# Patient Record
Sex: FEMALE | Race: WHITE
[De-identification: names, ages, dates, MRNs, and addresses within clinical notes are randomized per-mention and may not be internally consistent; named-entity substitution may affect disease eponyms.]

---

## 2019-10-29 ENCOUNTER — HOSPITAL ENCOUNTER (OUTPATIENT)
Dept: HOSPITAL 47 - LABWHC1 | Age: 79
Discharge: HOME | End: 2019-10-29
Attending: INTERNAL MEDICINE
Payer: MEDICARE

## 2019-10-29 DIAGNOSIS — M81.0: Primary | ICD-10-CM

## 2019-10-29 LAB
ALBUMIN SERPL-MCNC: 4.4 G/DL (ref 3.8–4.9)
ALBUMIN/GLOB SERPL: 2.44 G/DL (ref 1.6–3.17)
ALP SERPL-CCNC: 121 U/L (ref 41–126)
ALT SERPL-CCNC: 17 U/L (ref 8–44)
ANION GAP SERPL CALC-SCNC: 7.6 MMOL/L (ref 4–12)
AST SERPL-CCNC: 24 U/L (ref 13–35)
BUN SERPL-SCNC: 21 MG/DL (ref 9–27)
BUN/CREAT SERPL: 35 RATIO (ref 12–20)
CALCIUM SPEC-MCNC: 9.3 MG/DL (ref 8.7–10.3)
CHLORIDE SERPL-SCNC: 109 MMOL/L (ref 96–109)
CO2 SERPL-SCNC: 26.4 MMOL/L (ref 21.6–31.8)
GLOBULIN SER CALC-MCNC: 1.8 G/DL (ref 1.6–3.3)
GLUCOSE SERPL-MCNC: 96 MG/DL (ref 70–110)
POTASSIUM SERPL-SCNC: 4.1 MMOL/L (ref 3.5–5.5)
PROT SERPL-MCNC: 6.2 G/DL (ref 6.2–8.2)
SODIUM SERPL-SCNC: 143 MMOL/L (ref 135–145)

## 2019-10-29 PROCEDURE — 80053 COMPREHEN METABOLIC PANEL: CPT

## 2019-10-29 PROCEDURE — 84443 ASSAY THYROID STIM HORMONE: CPT

## 2019-10-29 PROCEDURE — 82306 VITAMIN D 25 HYDROXY: CPT

## 2019-10-29 PROCEDURE — 83970 ASSAY OF PARATHORMONE: CPT

## 2019-10-29 PROCEDURE — 36415 COLL VENOUS BLD VENIPUNCTURE: CPT

## 2020-09-21 ENCOUNTER — HOSPITAL ENCOUNTER (INPATIENT)
Dept: HOSPITAL 47 - EC | Age: 80
LOS: 4 days | Discharge: NURSING, REHAB OR LONG-TERM CARE | DRG: 872 | End: 2020-09-25
Attending: HOSPITALIST | Admitting: HOSPITALIST
Payer: MEDICARE

## 2020-09-21 DIAGNOSIS — G20: ICD-10-CM

## 2020-09-21 DIAGNOSIS — K64.9: ICD-10-CM

## 2020-09-21 DIAGNOSIS — Z20.828: ICD-10-CM

## 2020-09-21 DIAGNOSIS — I48.0: ICD-10-CM

## 2020-09-21 DIAGNOSIS — A41.9: Primary | ICD-10-CM

## 2020-09-21 DIAGNOSIS — Z90.49: ICD-10-CM

## 2020-09-21 DIAGNOSIS — I10: ICD-10-CM

## 2020-09-21 DIAGNOSIS — K52.9: ICD-10-CM

## 2020-09-21 DIAGNOSIS — Z87.891: ICD-10-CM

## 2020-09-21 DIAGNOSIS — F41.9: ICD-10-CM

## 2020-09-21 DIAGNOSIS — Z79.899: ICD-10-CM

## 2020-09-21 DIAGNOSIS — Z86.79: ICD-10-CM

## 2020-09-21 DIAGNOSIS — Z79.82: ICD-10-CM

## 2020-09-21 DIAGNOSIS — E86.0: ICD-10-CM

## 2020-09-21 DIAGNOSIS — Z98.41: ICD-10-CM

## 2020-09-21 DIAGNOSIS — R53.1: ICD-10-CM

## 2020-09-21 DIAGNOSIS — E83.42: ICD-10-CM

## 2020-09-21 DIAGNOSIS — K62.3: ICD-10-CM

## 2020-09-21 DIAGNOSIS — E87.6: ICD-10-CM

## 2020-09-21 DIAGNOSIS — K57.32: ICD-10-CM

## 2020-09-21 DIAGNOSIS — K21.9: ICD-10-CM

## 2020-09-21 DIAGNOSIS — F32.9: ICD-10-CM

## 2020-09-21 PROCEDURE — 99285 EMERGENCY DEPT VISIT HI MDM: CPT

## 2020-09-21 PROCEDURE — 36415 COLL VENOUS BLD VENIPUNCTURE: CPT

## 2020-09-21 PROCEDURE — 93005 ELECTROCARDIOGRAM TRACING: CPT

## 2020-09-21 PROCEDURE — 51701 INSERT BLADDER CATHETER: CPT

## 2020-09-21 PROCEDURE — 85730 THROMBOPLASTIN TIME PARTIAL: CPT

## 2020-09-21 PROCEDURE — 83605 ASSAY OF LACTIC ACID: CPT

## 2020-09-21 PROCEDURE — 84443 ASSAY THYROID STIM HORMONE: CPT

## 2020-09-21 PROCEDURE — 71046 X-RAY EXAM CHEST 2 VIEWS: CPT

## 2020-09-21 PROCEDURE — 85610 PROTHROMBIN TIME: CPT

## 2020-09-21 PROCEDURE — 85025 COMPLETE CBC W/AUTO DIFF WBC: CPT

## 2020-09-21 PROCEDURE — 74177 CT ABD & PELVIS W/CONTRAST: CPT

## 2020-09-21 PROCEDURE — 96375 TX/PRO/DX INJ NEW DRUG ADDON: CPT

## 2020-09-21 PROCEDURE — 85027 COMPLETE CBC AUTOMATED: CPT

## 2020-09-21 PROCEDURE — 81003 URINALYSIS AUTO W/O SCOPE: CPT

## 2020-09-21 PROCEDURE — 93306 TTE W/DOPPLER COMPLETE: CPT

## 2020-09-21 PROCEDURE — 83735 ASSAY OF MAGNESIUM: CPT

## 2020-09-21 PROCEDURE — 87040 BLOOD CULTURE FOR BACTERIA: CPT

## 2020-09-21 PROCEDURE — 96374 THER/PROPH/DIAG INJ IV PUSH: CPT

## 2020-09-21 PROCEDURE — 80048 BASIC METABOLIC PNL TOTAL CA: CPT

## 2020-09-21 PROCEDURE — 80053 COMPREHEN METABOLIC PANEL: CPT

## 2020-09-22 LAB
ALBUMIN SERPL-MCNC: 3.5 G/DL (ref 3.5–5)
ALP SERPL-CCNC: 137 U/L (ref 38–126)
ALT SERPL-CCNC: 9 U/L (ref 4–34)
ANION GAP SERPL CALC-SCNC: 7 MMOL/L
APTT BLD: 24.7 SEC (ref 22–30)
AST SERPL-CCNC: 35 U/L (ref 14–36)
BASOPHILS # BLD AUTO: 0 K/UL (ref 0–0.2)
BASOPHILS NFR BLD AUTO: 0 %
BUN SERPL-SCNC: 30 MG/DL (ref 7–17)
CALCIUM SPEC-MCNC: 8.7 MG/DL (ref 8.4–10.2)
CHLORIDE SERPL-SCNC: 106 MMOL/L (ref 98–107)
CO2 SERPL-SCNC: 23 MMOL/L (ref 22–30)
EOSINOPHIL # BLD AUTO: 0 K/UL (ref 0–0.7)
EOSINOPHIL NFR BLD AUTO: 0 %
ERYTHROCYTE [DISTWIDTH] IN BLOOD BY AUTOMATED COUNT: 4 M/UL (ref 3.8–5.4)
ERYTHROCYTE [DISTWIDTH] IN BLOOD: 12.8 % (ref 11.5–15.5)
GLUCOSE SERPL-MCNC: 122 MG/DL (ref 74–99)
HCT VFR BLD AUTO: 37.3 % (ref 34–46)
HGB BLD-MCNC: 12.1 GM/DL (ref 11.4–16)
INR PPP: 1.1 (ref ?–1.2)
LYMPHOCYTES # SPEC AUTO: 0.3 K/UL (ref 1–4.8)
LYMPHOCYTES NFR SPEC AUTO: 3 %
MAGNESIUM SPEC-SCNC: 2.2 MG/DL (ref 1.6–2.3)
MCH RBC QN AUTO: 30.4 PG (ref 25–35)
MCHC RBC AUTO-ENTMCNC: 32.5 G/DL (ref 31–37)
MCV RBC AUTO: 93.4 FL (ref 80–100)
MONOCYTES # BLD AUTO: 0.4 K/UL (ref 0–1)
MONOCYTES NFR BLD AUTO: 4 %
NEUTROPHILS # BLD AUTO: 10.6 K/UL (ref 1.3–7.7)
NEUTROPHILS NFR BLD AUTO: 93 %
PH UR: 6.5 [PH] (ref 5–8)
PLATELET # BLD AUTO: 188 K/UL (ref 150–450)
POTASSIUM SERPL-SCNC: 4.1 MMOL/L (ref 3.5–5.1)
PROT SERPL-MCNC: 6.1 G/DL (ref 6.3–8.2)
PT BLD: 11.4 SEC (ref 9–12)
SODIUM SERPL-SCNC: 136 MMOL/L (ref 137–145)
SP GR UR: 1.03 (ref 1–1.03)
UROBILINOGEN UR QL STRIP: 3 MG/DL (ref ?–2)
WBC # BLD AUTO: 11.4 K/UL (ref 3.8–10.6)

## 2020-09-22 RX ADMIN — METOPROLOL TARTRATE SCH MG: 25 TABLET, FILM COATED ORAL at 21:00

## 2020-09-22 RX ADMIN — METOPROLOL TARTRATE SCH MG: 25 TABLET, FILM COATED ORAL at 17:12

## 2020-09-22 RX ADMIN — BACLOFEN SCH MG: 10 TABLET ORAL at 17:12

## 2020-09-22 RX ADMIN — FLECAINIDE ACETATE SCH MG: 50 TABLET ORAL at 13:25

## 2020-09-22 RX ADMIN — CEFAZOLIN SCH MLS/HR: 330 INJECTION, POWDER, FOR SOLUTION INTRAMUSCULAR; INTRAVENOUS at 03:30

## 2020-09-22 RX ADMIN — CARBIDOPA AND LEVODOPA SCH EACH: 25; 100 TABLET ORAL at 17:12

## 2020-09-22 RX ADMIN — CARBIDOPA AND LEVODOPA SCH EACH: 25; 100 TABLET ORAL at 23:07

## 2020-09-22 RX ADMIN — CEFAZOLIN SCH MLS/HR: 330 INJECTION, POWDER, FOR SOLUTION INTRAMUSCULAR; INTRAVENOUS at 21:12

## 2020-09-22 RX ADMIN — CARBIDOPA AND LEVODOPA SCH EACH: 25; 100 TABLET ORAL at 20:58

## 2020-09-22 RX ADMIN — CARBIDOPA AND LEVODOPA SCH EACH: 25; 100 TABLET ORAL at 13:25

## 2020-09-22 RX ADMIN — METRONIDAZOLE SCH MLS/HR: 500 INJECTION, SOLUTION INTRAVENOUS at 21:00

## 2020-09-22 RX ADMIN — METOPROLOL TARTRATE SCH MG: 25 TABLET, FILM COATED ORAL at 13:25

## 2020-09-22 RX ADMIN — FLECAINIDE ACETATE SCH MG: 50 TABLET ORAL at 23:07

## 2020-09-22 RX ADMIN — BUTALBITAL, ACETAMINOPHEN, AND CAFFEINE PRN EACH: 50; 325; 40 TABLET ORAL at 19:05

## 2020-09-22 RX ADMIN — BACLOFEN SCH MG: 10 TABLET ORAL at 21:00

## 2020-09-22 RX ADMIN — NICARDIPINE HYDROCHLORIDE SCH MLS/HR: 2.5 INJECTION INTRAVENOUS at 01:21

## 2020-09-22 RX ADMIN — METRONIDAZOLE SCH MLS/HR: 500 INJECTION, SOLUTION INTRAVENOUS at 13:18

## 2020-09-22 RX ADMIN — NICARDIPINE HYDROCHLORIDE SCH MLS/HR: 2.5 INJECTION INTRAVENOUS at 00:50

## 2020-09-22 NOTE — CT
EXAMINATION TYPE: CT abdomen pelvis w con

 

DATE OF EXAM: 9/22/2020

 

COMPARISON: None

 

HISTORY: Abd Pain

 

CT DLP: 883.70 mGycm

Automated exposure control for dose reduction was used.

 

CONTRAST: 

Performed with IV Contrast, patient injected with 100 mL of Isovue 300.

 

Heart is enlarged. There is some patchy atelectasis at the lung bases. There is metal artifact from m
ultilevel posterior lower thoracic and lumbar spine fusion surgery.

 

Liver shows no focal defect. There are clips apparently from cholecystectomy. Spleen is intact. There
 is no sign of pancreatic mass.

 

There is no adrenal mass. Kidneys show satisfactory contrast opacification. There is no hydronephrosi
s. Ureters are not dilated. There is no retroperitoneal adenopathy. Bladder distends smoothly. There 
is no inguinal hernia. There is small amount of free fluid in the abdomen. There appears to be wall t
hickening of the sigmoid colon.

 

Appendix is posterior and appears normal. I see no sign of a bowel obstruction. There are no dilated 
loops. There is no evidence of free air.

 

The bony pelvis is intact. There are numerous thoracic and lumbar compression fractures. There are co
mpression fractures up to 40%. This is seen at L2 L1 T10 and T9 vertebra.

 

IMPRESSION:

There is minimal free fluid in the abdomen. There appears to be wall thickening of the sigmoid colon 
suggestive of nonspecific colitis. No free air.

 

Mildly dilated biliary tree but no obstructing lesion seen.

## 2020-09-22 NOTE — P.HPIM
History of Present Illness


The patient is a pleasant 79-year-old female came in now with the complaints of 

diarrhea watery with diffuse abdominal cramping restarted yesterday patient 

developed had fever last night.  Patient's abdominal pain is cramping in sens

ation.  Patient was told patient may have rectal prolapse by family members 

unsure whether it's hemorrhoid coming out of the rectum.  Patient also found to 

be in atrial fibrillation with rapid ventricular rate was started on Cardizem 

and do not have any previous echocardiogram available at this time.  Patient 

denied any cough patient denied any URI symptoms.  Patient was exposed to sick 

contacts and had a recent general also has diarrhea.








Review of Systems








REVIEW OF SYSTEMS: 


CONSTITUTIONAL: No fever, no malaise, no fatigue. 


HEENT: No recent visual problems or hearing problems. Denied any sore throat. 


CARDIOVASCULAR: No chest pain, orthopnea, PND, no palpitations, no syncope. 


PULMONARY: No shortness of breath, no cough, no hemoptysis. 


GASTROINTESTINAL: As mentioned in HPI


NEUROLOGICAL: No headaches, no weakness, no numbness. 


HEMATOLOGICAL: Denies any bleeding or petechiae. 


GENITOURINARY: Denies any burning micturition, frequency, or urgency. 


MUSCULOSKELETAL/RHEUMATOLOGICAL: Denies any joint pain, swelling, or any muscle 

pain. 


ENDOCRINE: Denies any polyuria or polydipsia. 





The rest of the 14-point review of systems is negative.











Past Medical History


Past Medical History: Atrial Fibrillation, Eye Disorder, GERD/Reflux, 

Hypertension, Mitral Valve Prolapse (MVP)


Additional Past Medical History / Comment(s): HYPOGLYCEMIC


History of Any Multi-Drug Resistant Organisms: None Reported


Past Surgical History: Back Surgery, Cholecystectomy


Additional Past Surgical History / Comment(s): RT CATARACT REMOVED.  

COLONOSCOPY.  HEMORRHOIDECTOMY


Past Anesthesia/Blood Transfusion Reactions: No Reported Reaction


Past Psychological History: Anxiety, Depression


Smoking Status: Former smoker


Past Alcohol Use History: None Reported


Past Drug Use History: None Reported





Medications and Allergies


                                Home Medications











 Medication  Instructions  Recorded  Confirmed  Type


 


Aspirin 81 mg PO HS 14 History


 


Baclofen [Lioresal] 10 mg PO Q6H 14 History


 


Butalb/Acetaminophen/Caffeine 1 - 2 tab PO TID PRN 14 History





[Fioricet]    


 


Sertraline [Zoloft] 50 mg PO DAILY 14 History


 


atenoloL [Tenormin] 25 mg PO BID@1200,2330 14 History


 


Carbidopa-Levodopa  mg 1 tab PO 5XD 20 History





[Sinemet ]    


 


Flecainide [Tambocor] 50 mg PO BID@1200,2330 20 History


 


Methyl Salicylate/Menthol 1 patch TOPICAL BID 20 History





[Salonpas Patch]    








                                    Allergies











Allergy/AdvReac Type Severity Reaction Status Date / Time


 


No Known Allergies Allergy   Verified 20 08:38














Physical Exam


Vitals: 


                                   Vital Signs











  Temp Pulse Pulse Resp BP BP Pulse Ox


 


 20 08:00  99.3 F   100  16   93/69  92 L


 


 20 05:35    98  18   


 


 20 04:55  101.1 F H   98  18   111/66  93 L


 


 20 04:31  98.7 F  107 H   14  99/69   96


 


 20 04:00   122 H   14  123/76   94 L


 


 20 03:43   128 H   18  136/92   95


 


 20 03:00   126 H   21  116/85  


 


 20 02:30   142 H   16  100/75  


 


 20 02:00   110 H   11 L  96/73  


 


 20 01:41   101 H   16  103/69  


 


 20 01:30   118 H   23  103/69  


 


 20 01:00   124 H   41 H   


 


 20 00:44   140 H   23  103/69  


 


 20 00:14  99.8 F H  115 H   16  103/69   93 L








                                Intake and Output











 20





 22:59 06:59 14:59


 


Other:   


 


  Voiding Method  Bedside Commode 





  Diaper 


 


  # Voids  1 


 


  Weight  57.1 kg 

















PHYSICAL EXAMINATION: 





GENERAL: The patient is alert and oriented x3, not in any acute distress.  Thin 

built female


HEENT: Pupils are round and equally reacting to light. EOMI. No scleral icterus.

No conjunctival pallor. Normocephalic, atraumatic. No pharyngeal erythema. No 

thyromegaly. 


CARDIOVASCULAR: S1 and S2 present. No murmurs, rubs, or gallops. 


PULMONARY: Chest is clear to auscultation, no wheezing or crackles. 


ABDOMEN: Soft, nontender, nondistended, normoactive bowel sounds. No palpable 

organomegaly.  Rectal exam was not done


MUSCULOSKELETAL: No joint swelling or deformity.


EXTREMITIES: No cyanosis, clubbing, or pedal edema. 


NEUROLOGICAL: Gross neurological examination did not reveal any focal deficits. 


SKIN: No rashes. 

















Results


CBC & Chem 7: 


                                 20 00:13





                                 20 00:13


Labs: 


                  Abnormal Lab Results - Last 24 Hours (Table)











  20 Range/Units





  00: 00:13 02:45 


 


WBC  11.4 H    (3.8-10.6)  k/uL


 


Neutrophils #  10.6 H    (1.3-7.7)  k/uL


 


Lymphocytes #  0.3 L    (1.0-4.8)  k/uL


 


Sodium   136 L   (137-145)  mmol/L


 


BUN   30 H   (7-17)  mg/dL


 


Glucose   122 H   (74-99)  mg/dL


 


Alkaline Phosphatase   137 H   ()  U/L


 


Total Protein   6.1 L   (6.3-8.2)  g/dL


 


Urine Protein    Trace H  (Negative)  














Thrombosis Risk Factor Assmnt





- Choose All That Apply


Each Risk Factor Represents 3 Points: Age 75 years or older


Thrombosis Risk Factor Assessment Total Risk Factor Score: 3


Thrombosis Risk Factor Assessment Level: Moderate Risk





Assessment and Plan


Plan: 


-Sepsis: Possibility of colitis only mild colitis and the CT.  C. diff for 

colitis and it to be ruled out as well.  Continue with Rocephin and 

metronidazole for now.  We'll also rule out covid 19 considering recent visit to

 and no clear-cut source of infection although infectious colitis can be 

the source.  I'll also obtain a chest x-ray.  Urinalysis did not show any 

significant abnormality


-Atrial fibrillation: New-onset patient was started on Cardizem, presently not 

on any anti-coagulation cardiology was consulted.


-Gastroesophageal reflux disease


-Hypertension


-History of mitral valve prolapse


-If patient is not started on any anticoagulation with cardiology I'll start her

on dvt prophylaxis

## 2020-09-22 NOTE — ED
Nausea/Vomiting/Diarrhea HPI





- General


Chief complaint: Nausea/Vomiting/Diarrhea


Stated complaint: diarrhea


Time Seen by Provider: 09/21/20 23:54


Source: patient, family, EMS


Mode of arrival: EMS


Limitations: no limitations





- History of Present Illness


Initial comments: 


Alena is a pleasant 78yo female with PMH of afib RVR who presents to the ER 

today via EMS for evaluation of 24hrs of watery diarrhea, diffuse abdominal 

cramping, low grade fever and blood in her stool. Patient reports she has had 

diarrhea, she noticed blood in the stool this evening, her daughter states that 

when she attempted to help clean her mother she noted some "tissue" hanging from

her rectum she is unsure if it was a hemorrhoid or her rectum coming out. 








- Related Data


                                Home Medications











 Medication  Instructions  Recorded  Confirmed


 


ALPRAZolam [Xanax] 0.5 mg PO QAM 12/08/14 12/11/14


 


Aspirin 81 mg PO DAILY 12/08/14 12/11/14


 


Baclofen [Lioresal] 10 mg PO QID 12/08/14 12/11/14


 


Butalb/Acetaminophen/Caffeine 1 - 2 each PO TID PRN 12/08/14 12/11/14





[Fioricet]   


 


Furosemide [Lasix] 20 mg PO BID 12/08/14 12/11/14


 


Omeprazole [PriLOSEC] 20 mg PO AC-BRKFST 12/08/14 12/11/14


 


Primidone [Mysoline] 50 mg PO TID 12/08/14 12/11/14


 


Sertraline [Zoloft] 50 mg PO DAILY 12/08/14 12/11/14


 


atenoloL [Tenormin] 25 mg PO BID 12/08/14 12/11/14


 


oxyCODONE-APAP 10-325MG [Percocet 1 each PO Q6HR PRN 12/08/14 12/11/14





]   











                                    Allergies











Allergy/AdvReac Type Severity Reaction Status Date / Time


 


No Known Allergies Allergy   Verified 09/22/20 00:18














Review of Systems


ROS Statement: 


Those systems with pertinent positive or pertinent negative responses have been 

documented in the HPI.





ROS Other: All systems not noted in ROS Statement are negative.





Past Medical History


Past Medical History: Atrial Fibrillation, Eye Disorder, GERD/Reflux, 

Hypertension, Mitral Valve Prolapse (MVP)


Additional Past Medical History / Comment(s): HYPOGLYCEMIC


History of Any Multi-Drug Resistant Organisms: None Reported


Past Surgical History: Back Surgery, Cholecystectomy


Additional Past Surgical History / Comment(s): RT CATARACT REMOVED.  

COLONOSCOPY.  HEMORRHOIDECTOMY


Past Anesthesia/Blood Transfusion Reactions: No Reported Reaction


Past Psychological History: Anxiety, Depression


Past Alcohol Use History: None Reported


Past Drug Use History: None Reported





General Exam





- General Exam Comments


Initial Comments: 


Physical Exam


GENERAL:


Patient is well-developed and well-nourished.  Patient is nontoxic and well-

hydrated and is in no distress.





HENT:


Normocephalic, Atraumatic. 





EYES:


PERRL, EOMI





PULMONARY:


Unlabored respirations.  No audible rales rhonchi or wheezing was noted.





CARDIOVASCULAR:


Irregularly irregular, 





ABDOMEN:


Soft and nontender with normal bowel sounds. 





SKIN:


Skin is dry and tenting





: 


Normal external genitalia


Rectal exam with external hemorrhoids, no rectal prolapse


Stage 2 decubitus ulcer noted 





NEUROLOGIC:


Patient is alert and oriented x3.  


Moving all extremities spontaneously





MUSCULOSKELETAL:


Normal extremities with adequate strength and full range of motion.  No lower 

extremity swelling or edema.  No calf tenderness.  





PSYCHIATRIC:


Normal psychiatric evaluation.





Limitations: no limitations





Course


                                   Vital Signs











  09/22/20 09/22/20 09/22/20





  00:14 00:44 01:00


 


Temperature 99.8 F H  


 


Pulse Rate 115 H 140 H 124 H


 


Respiratory 16 23 41 H





Rate   


 


Blood Pressure 103/69 103/69 


 


O2 Sat by Pulse 93 L  





Oximetry   














  09/22/20 09/22/20 09/22/20





  01:30 01:41 02:00


 


Temperature   


 


Pulse Rate 118 H 101 H 110 H


 


Respiratory 23 16 11 L





Rate   


 


Blood Pressure 103/69 103/69 96/73


 


O2 Sat by Pulse   





Oximetry   














  09/22/20 09/22/20 09/22/20





  02:30 03:00 03:43


 


Temperature   


 


Pulse Rate 142 H 126 H 128 H


 


Respiratory 16 21 18





Rate   


 


Blood Pressure 100/75 116/85 136/92


 


O2 Sat by Pulse   95





Oximetry   














  09/22/20





  04:00


 


Temperature 


 


Pulse Rate 122 H


 


Respiratory 14





Rate 


 


Blood Pressure 123/76


 


O2 Sat by Pulse 94 L





Oximetry 














Medical Decision Making





- Medical Decision Making


Patient was seen and evaluated history is obtained from patient and daughter 

bedside


Chronically ill 79-year-old female with 24 hours of diarrhea, appears dehydrated

is noted to be in A. fib with RVR this is likely secondary to dehydration as 

well as inability to absorb her medications for the past 24 hours due to 

profound diarrhea





Labs and IV fluids were ordered





Was relatively unremarkable, however given the fever and diarrhea computed 

tomography scan of the abdomen was ordered





Patient's A. fib RVR was treated with a single dose of Lopressor however there 

is minimal improvement decision was made to start a Cardizem drip





Computed tomography scan revealed colitis, Rocephin and Flagyl were ordered





Patient to be admitted for colitis, dehydration, A. fib RVR








- Lab Data


Result diagrams: 


                                 09/22/20 00:13





                                 09/22/20 00:13


                                   Lab Results











  09/22/20 09/22/20 09/22/20 Range/Units





  00:13 00:13 00:13 


 


WBC  11.4 H    (3.8-10.6)  k/uL


 


RBC  4.00    (3.80-5.40)  m/uL


 


Hgb  12.1    (11.4-16.0)  gm/dL


 


Hct  37.3    (34.0-46.0)  %


 


MCV  93.4    (80.0-100.0)  fL


 


MCH  30.4    (25.0-35.0)  pg


 


MCHC  32.5    (31.0-37.0)  g/dL


 


RDW  12.8    (11.5-15.5)  %


 


Plt Count  188    (150-450)  k/uL


 


Neutrophils %  93    %


 


Lymphocytes %  3    %


 


Monocytes %  4    %


 


Eosinophils %  0    %


 


Basophils %  0    %


 


Neutrophils #  10.6 H    (1.3-7.7)  k/uL


 


Lymphocytes #  0.3 L    (1.0-4.8)  k/uL


 


Monocytes #  0.4    (0-1.0)  k/uL


 


Eosinophils #  0.0    (0-0.7)  k/uL


 


Basophils #  0.0    (0-0.2)  k/uL


 


PT   11.4   (9.0-12.0)  sec


 


INR   1.1   (<1.2)  


 


APTT   24.7   (22.0-30.0)  sec


 


Sodium    136 L  (137-145)  mmol/L


 


Potassium    4.1  (3.5-5.1)  mmol/L


 


Chloride    106  ()  mmol/L


 


Carbon Dioxide    23  (22-30)  mmol/L


 


Anion Gap    7  mmol/L


 


BUN    30 H  (7-17)  mg/dL


 


Creatinine    0.59  (0.52-1.04)  mg/dL


 


Est GFR (CKD-EPI)AfAm    >90  (>60 ml/min/1.73 sqM)  


 


Est GFR (CKD-EPI)NonAf    87  (>60 ml/min/1.73 sqM)  


 


Glucose    122 H  (74-99)  mg/dL


 


Plasma Lactic Acid Atilio     (0.7-2.0)  mmol/L


 


Calcium    8.7  (8.4-10.2)  mg/dL


 


Magnesium    2.2  (1.6-2.3)  mg/dL


 


Total Bilirubin    0.6  (0.2-1.3)  mg/dL


 


AST    35  (14-36)  U/L


 


ALT    9  (4-34)  U/L


 


Alkaline Phosphatase    137 H  ()  U/L


 


Total Protein    6.1 L  (6.3-8.2)  g/dL


 


Albumin    3.5  (3.5-5.0)  g/dL


 


Urine Color     


 


Urine Appearance     (Clear)  


 


Urine pH     (5.0-8.0)  


 


Ur Specific Gravity     (1.001-1.035)  


 


Urine Protein     (Negative)  


 


Urine Glucose (UA)     (Negative)  


 


Urine Ketones     (Negative)  


 


Urine Blood     (Negative)  


 


Urine Nitrite     (Negative)  


 


Urine Bilirubin     (Negative)  


 


Urine Urobilinogen     (<2.0)  mg/dL


 


Ur Leukocyte Esterase     (Negative)  














  09/22/20 09/22/20 Range/Units





  00:13 02:45 


 


WBC    (3.8-10.6)  k/uL


 


RBC    (3.80-5.40)  m/uL


 


Hgb    (11.4-16.0)  gm/dL


 


Hct    (34.0-46.0)  %


 


MCV    (80.0-100.0)  fL


 


MCH    (25.0-35.0)  pg


 


MCHC    (31.0-37.0)  g/dL


 


RDW    (11.5-15.5)  %


 


Plt Count    (150-450)  k/uL


 


Neutrophils %    %


 


Lymphocytes %    %


 


Monocytes %    %


 


Eosinophils %    %


 


Basophils %    %


 


Neutrophils #    (1.3-7.7)  k/uL


 


Lymphocytes #    (1.0-4.8)  k/uL


 


Monocytes #    (0-1.0)  k/uL


 


Eosinophils #    (0-0.7)  k/uL


 


Basophils #    (0-0.2)  k/uL


 


PT    (9.0-12.0)  sec


 


INR    (<1.2)  


 


APTT    (22.0-30.0)  sec


 


Sodium    (137-145)  mmol/L


 


Potassium    (3.5-5.1)  mmol/L


 


Chloride    ()  mmol/L


 


Carbon Dioxide    (22-30)  mmol/L


 


Anion Gap    mmol/L


 


BUN    (7-17)  mg/dL


 


Creatinine    (0.52-1.04)  mg/dL


 


Est GFR (CKD-EPI)AfAm    (>60 ml/min/1.73 sqM)  


 


Est GFR (CKD-EPI)NonAf    (>60 ml/min/1.73 sqM)  


 


Glucose    (74-99)  mg/dL


 


Plasma Lactic Acid Atilio  1.3   (0.7-2.0)  mmol/L


 


Calcium    (8.4-10.2)  mg/dL


 


Magnesium    (1.6-2.3)  mg/dL


 


Total Bilirubin    (0.2-1.3)  mg/dL


 


AST    (14-36)  U/L


 


ALT    (4-34)  U/L


 


Alkaline Phosphatase    ()  U/L


 


Total Protein    (6.3-8.2)  g/dL


 


Albumin    (3.5-5.0)  g/dL


 


Urine Color   Yellow  


 


Urine Appearance   Clear  (Clear)  


 


Urine pH   6.5  (5.0-8.0)  


 


Ur Specific Gravity   1.031  (1.001-1.035)  


 


Urine Protein   Trace H  (Negative)  


 


Urine Glucose (UA)   Negative  (Negative)  


 


Urine Ketones   Negative  (Negative)  


 


Urine Blood   Negative  (Negative)  


 


Urine Nitrite   Negative  (Negative)  


 


Urine Bilirubin   Negative  (Negative)  


 


Urine Urobilinogen   3.0  (<2.0)  mg/dL


 


Ur Leukocyte Esterase   Negative  (Negative)  














- EKG Data


-: EKG Interpreted by Me


EKG Comments: 


KG was obtained due to tachycardia, EKG was obtained at 12:29 AM, rate is 119 

rhythm is a narrow complex irregularly irregular tachycardia consistent with A. 

fib with RVR there is no acute ST elevations or depressions no evidence of acute

ischemia or infarction.








Disposition


Clinical Impression: 


 Atrial fibrillation with RVR, Dehydration, Diarrhea, Bleeding hemorrhoid, 

Colitis





Disposition: ADMITTED AS IP TO THIS HOSP


Condition: Serious


Is patient prescribed a controlled substance at d/c from ED?: No

## 2020-09-22 NOTE — ECHOF
Referral Reason:afib



MEASUREMENTS

--------

HEIGHT: 152.4 cm

WEIGHT: 56.7 kg

BP: 

IVSd:   1.2 cm     (0.6 - 1.1)

LVIDd:   3.9 cm     (3.9 - 5.3)

LVPWd:   1.2 cm     (0.6 - 1.1)

IVSs:   1.5 cm

LVIDs:   3.0 cm

LVPWs:   1.5 cm

LA Diam:   4.1 cm     (2.7 - 3.8)

RVIDd:   2.9 cm     (< 3.3)

LAESV Index (A-L):   52.19 ml/m

Ao Diam:   3.0 cm     (2.0 - 3.7)

AV Cusp:   2.1 cm     (1.5 - 2.6)

EPSS:   0.1 cm

RAP:   15.00 mmHg

RVSP:   46.35 mmHg

MV EF SLOPE:   215.54 mm/s     (70 - 150)

MV EXCURSION:   17.77 mm     (> 18.000)







FINDINGS

--------

Atrial fibrillation.

This was a technically excellent study.

The left ventricular size is normal.   There is borderline concentric left ventricular hypertrophy.  
 Overall left ventricular systolic function is mildly impaired with, an EF between 45 - 50 %.

The right ventricle is normal in size.

LA is severely dilated >40 ml/m2

The right atrium is normal in size.

Interatrial and interventricular septum intact.

There is mild aortic valve sclerosis.

Mild-to-moderate mitral regurgitation is present.

Mild tricuspid regurgitation present.   There is moderate pulmonary hypertension.   The right ventric
ular systolic pressure, as measured by Doppler, is 46.35mmHg.

Trace/mild (physiologic)  pulmonic regurgitation.

The aortic root size is normal.

The inferior vena cava is dilated with poor inspiratory collapse which is consistent with estimated r
ight atrial pressure of 15 mmHg.

There is no pericardial effusion.



CONCLUSIONS

--------

1. The left ventricular size is normal.

2. There is borderline concentric left ventricular hypertrophy.

3. LA is severely dilated >40 ml/m2

4. Mild-to-moderate mitral regurgitation is present.

5. Mild tricuspid regurgitation present.

6. There is moderate pulmonary hypertension.

7. The right ventricular systolic pressure, as measured by Doppler, is 46.35mmHg.

8. Trace/mild (physiologic)  pulmonic regurgitation.

9. The inferior vena cava is dilated with poor inspiratory collapse which is consistent with estimate
d right atrial pressure of 15 mmHg.

10. There is no pericardial effusion.





SONOGRAPHER: Vanessa Dias RDCS

## 2020-09-22 NOTE — XR
EXAMINATION TYPE: XR chest 2V

 

DATE OF EXAM: 9/22/2020

 

COMPARISON: None

 

INDICATION: Pneumonia difficulty breathing

 

TECHNIQUE:  Frontal and lateral views of the chest are obtained.

 

FINDINGS:  

The heart size is normal.  

The pulmonary vasculature is normal.

Some mild streak atelectasis may be at the left base. Some mild blunting left costophrenic angle is p
resent suggesting small effusion. Large portion of the left lung and portion of the medial right uppe
r lobe cannot be well evaluated due to the kyphosis and head over this portion of the study..

 

IMPRESSION:  

1. Limited exam.

2. Mild streak atelectasis left lower lobe

## 2020-09-22 NOTE — P.GSCN
History of Present Illness


Consult date: 09/22/20


History of present illness: 





CHIEF COMPLAINT: Rectal prolapse





HISTORY OF PRESENT ILLNESS: This is a 79-year-old female with a known history of

paroxysmal atrial fibrillation, hypertension, GERD, mitral valve prolapse and 

cholecystectomy.  Patient presented to the emergency room with complaints of 

diarrhea and abdominal cramping.  Patient did notice some blood in her stools.  

Per patient she does feel that there is tissue hanging from her rectum.  She 

noticed symptoms started about 24-48 hours ago.  Patient did have a fever of 

101.1 early this morning.  She is also being followed by cardiology for atrial 

fibrillation with rapid ventricular response.  





PAST MEDICAL HISTORY: 


See list.





PAST SURGICAL HISTORY: 


See list.





MEDICATIONS: 


See list.





ALLERGIES: 


See list.





SOCIAL HISTORY: No illicit drug use.  





REVIEW OF SYSTEMS: 


CONSTITUTIONAL: Denies fever or chills.


HEENT: Denies blurred vision, vision changes, or eye pain. Denies hemoptysis 


CARDIOVASCULAR: Denies chest pain or pressure.


RESPIRATORY: No shortness of breath. 


GASTROINTESTINAL: See HPI for pertinent findings


HEMATOLOGIC: Denies bleeding disorders.


GENITOURINARY:  Denies any blood in urine or increased urinary frequency.  


SKIN: Denies pruitis. Denies rash.





PHYSICAL EXAM: 


VITAL SIGNS: Reviewed


GENERAL: Well-developed in no acute distress. 


HEENT:  No sclera icterus. Extraocular movements grossly intact.  Moist buccal 

mucosa. Head is atraumatic, normocephalic. No nasal drainage.


ABDOMEN:  Soft. Nondistended.  Nontender


NEUROLOGIC:  Alert and oriented.  Cranial nerves II through XII grossly intact.





LABORATORY DATA:


WBC is 11.4 hemoglobin 12.1 and platelets 188 INR 1.1 creatinine 0.59 BUN 30





IMAGING:


CT abdomen and pelvis there is minimal free fluid in the abdomen.  There appears

to be wall thickening of the sigmoid colon suggestive of nonspecific colitis.  

No free air.  Mildly dilated biliary tree but no obstructing lesions seen





ASSESSMENT: 


1.  Possible rectal prolapse


2.  Nausea, vomiting and diarrhea with possible colitis


3.  Paroxysmal atrial fibrillation with episode of rapid ventricular response 

followed by cardiology





PLAN: 


-No surgical intervention planned during admission


-Plan for outpatient colonoscopy once patient is stable


-Antibiotics per medicine





Thank you for this consultation.





Physician Assistant note has been reviewed by physician. Signing provider agrees

with the documented findings, assessment, and plan of care. 





Past Medical History


Past Medical History: Atrial Fibrillation, Eye Disorder, GERD/Reflux, 

Hypertension, Mitral Valve Prolapse (MVP)


Additional Past Medical History / Comment(s): HYPOGLYCEMIC


History of Any Multi-Drug Resistant Organisms: None Reported


Past Surgical History: Back Surgery, Cholecystectomy


Additional Past Surgical History / Comment(s): RT CATARACT REMOVED.  COLONOSCOP

Y.  HEMORRHOIDECTOMY


Past Anesthesia/Blood Transfusion Reactions: No Reported Reaction


Past Psychological History: Anxiety, Depression


Smoking Status: Former smoker


Past Alcohol Use History: None Reported


Past Drug Use History: None Reported





Medications and Allergies


                                Home Medications











 Medication  Instructions  Recorded  Confirmed  Type


 


Aspirin 81 mg PO HS 12/08/14 09/22/20 History


 


Baclofen [Lioresal] 10 mg PO Q6H 12/08/14 09/22/20 History


 


Butalb/Acetaminophen/Caffeine 1 - 2 tab PO TID PRN 12/08/14 09/22/20 History





[Fioricet]    


 


Sertraline [Zoloft] 50 mg PO DAILY 12/08/14 09/22/20 History


 


atenoloL [Tenormin] 25 mg PO BID@1200,2330 12/08/14 09/22/20 History


 


Carbidopa-Levodopa  mg 1 tab PO 5XD 09/22/20 09/22/20 History





[Sinemet ]    


 


Flecainide [Tambocor] 50 mg PO BID@1200,2330 09/22/20 09/22/20 History


 


Methyl Salicylate/Menthol 1 patch TOPICAL BID 09/22/20 09/22/20 History





[Salonpas Patch]    








                                    Allergies











Allergy/AdvReac Type Severity Reaction Status Date / Time


 


No Known Allergies Allergy   Verified 09/22/20 08:38














Surgical - Exam


                                   Vital Signs











Temp Pulse Resp BP Pulse Ox


 


 99.8 F H  115 H  16   103/69   93 L


 


 09/22/20 00:14  09/22/20 00:14  09/22/20 00:14  09/22/20 00:14  09/22/20 00:14














Results





- Labs





                                 09/22/20 00:13





                                 09/22/20 00:13


                  Abnormal Lab Results - Last 24 Hours (Table)











  09/22/20 09/22/20 09/22/20 Range/Units





  00:13 00:13 02:45 


 


WBC  11.4 H    (3.8-10.6)  k/uL


 


Neutrophils #  10.6 H    (1.3-7.7)  k/uL


 


Lymphocytes #  0.3 L    (1.0-4.8)  k/uL


 


Sodium   136 L   (137-145)  mmol/L


 


BUN   30 H   (7-17)  mg/dL


 


Glucose   122 H   (74-99)  mg/dL


 


Alkaline Phosphatase   137 H   ()  U/L


 


Total Protein   6.1 L   (6.3-8.2)  g/dL


 


Urine Protein    Trace H  (Negative)  








                                 Diabetes panel











  09/22/20 Range/Units





  00:13 


 


Sodium  136 L  (137-145)  mmol/L


 


Potassium  4.1  (3.5-5.1)  mmol/L


 


Chloride  106  ()  mmol/L


 


Carbon Dioxide  23  (22-30)  mmol/L


 


BUN  30 H  (7-17)  mg/dL


 


Creatinine  0.59  (0.52-1.04)  mg/dL


 


Glucose  122 H  (74-99)  mg/dL


 


Calcium  8.7  (8.4-10.2)  mg/dL


 


AST  35  (14-36)  U/L


 


ALT  9  (4-34)  U/L


 


Alkaline Phosphatase  137 H  ()  U/L


 


Total Protein  6.1 L  (6.3-8.2)  g/dL


 


Albumin  3.5  (3.5-5.0)  g/dL








                                  Thyroid panel











  09/22/20 Range/Units





  00:13 


 


TSH  1.150  (0.465-4.680)  mIU/L








                                  Calcium panel











  09/22/20 Range/Units





  00:13 


 


Calcium  8.7  (8.4-10.2)  mg/dL


 


Albumin  3.5  (3.5-5.0)  g/dL








                                 Pituitary panel











  09/22/20 09/22/20 Range/Units





  00:13 00:13 


 


Sodium  136 L   (137-145)  mmol/L


 


Potassium  4.1   (3.5-5.1)  mmol/L


 


Chloride  106   ()  mmol/L


 


Carbon Dioxide  23   (22-30)  mmol/L


 


BUN  30 H   (7-17)  mg/dL


 


Creatinine  0.59   (0.52-1.04)  mg/dL


 


Glucose  122 H   (74-99)  mg/dL


 


Calcium  8.7   (8.4-10.2)  mg/dL


 


TSH   1.150  (0.465-4.680)  mIU/L








                                  Adrenal panel











  09/22/20 Range/Units





  00:13 


 


Sodium  136 L  (137-145)  mmol/L


 


Potassium  4.1  (3.5-5.1)  mmol/L


 


Chloride  106  ()  mmol/L


 


Carbon Dioxide  23  (22-30)  mmol/L


 


BUN  30 H  (7-17)  mg/dL


 


Creatinine  0.59  (0.52-1.04)  mg/dL


 


Glucose  122 H  (74-99)  mg/dL


 


Calcium  8.7  (8.4-10.2)  mg/dL


 


Total Bilirubin  0.6  (0.2-1.3)  mg/dL


 


AST  35  (14-36)  U/L


 


ALT  9  (4-34)  U/L


 


Alkaline Phosphatase  137 H  ()  U/L


 


Total Protein  6.1 L  (6.3-8.2)  g/dL


 


Albumin  3.5  (3.5-5.0)  g/dL

## 2020-09-22 NOTE — P.CRDCN
History of Present Illness


Consult date: 09/22/20


Consult reason: atrial fibrillation


Chief complaint: Nausea, vomiting, diarrhea


History of present illness: 


This is an 79-year-old  female with documented history of Parkinson's 

disease, hypertension, mitral valve prolapse, paroxysmal atrial fibrillation, 

she follows with Dr. Villar in the office.  Patient had been on 

anticoagulantion in the past on Eliquis, she had an ALLERGIC reaction to this, 

which is why she's not currently on anticoagulation.  History was obtained from 

the medical record as the patient appears to be confused, apparently the 

daughter gave most of the history.  Patient presented to the hospital with 

symptoms of nausea, vomiting, and diarrhea for 24 hour duration.  She was also 

experiencing some abdominal cramping and low-grade fever.  Her EKG on 

presentation here showed atrial fibrillation with moderately rapid ventricular 

response.  CT of the abdomen and pelvis showed minimal free fluid in the 

abdomen.  Her temperature has been up to 101.1 here, this morning 99.3.  White 

blood cell count 11.4, hemoglobin 12.1, platelet count 188.  Sodium 136, 

potassium 4.1, BUN 30, creatinine 0.5.  Blood pressure 93/60 with a heart rate 

in the 192% on room air.  She was given a Cardizem bolus as well as one dose of 

IV Lopressor on admission here.  Her home medications include Tenormin 25 mg 

twice a day, flat and I 50 twice a day, Sinemet, failure status, aspirin, and 

Zoloft.








Past Medical History


Past Medical History: Atrial Fibrillation, Eye Disorder, GERD/Reflux, 

Hypertension, Mitral Valve Prolapse (MVP)


Additional Past Medical History / Comment(s): HYPOGLYCEMIC


History of Any Multi-Drug Resistant Organisms: None Reported


Past Surgical History: Back Surgery, Cholecystectomy


Additional Past Surgical History / Comment(s): RT CATARACT REMOVED.  

COLONOSCOPY.  HEMORRHOIDECTOMY


Past Anesthesia/Blood Transfusion Reactions: No Reported Reaction


Past Psychological History: Anxiety, Depression


Smoking Status: Former smoker


Past Alcohol Use History: None Reported


Past Drug Use History: None Reported





Medications and Allergies


                                Home Medications











 Medication  Instructions  Recorded  Confirmed  Type


 


Aspirin 81 mg PO HS 12/08/14 09/22/20 History


 


Baclofen [Lioresal] 10 mg PO Q6H 12/08/14 09/22/20 History


 


Butalb/Acetaminophen/Caffeine 1 - 2 tab PO TID PRN 12/08/14 09/22/20 History





[Fioricet]    


 


Sertraline [Zoloft] 50 mg PO DAILY 12/08/14 09/22/20 History


 


atenoloL [Tenormin] 25 mg PO BID@1200,2330 12/08/14 09/22/20 History


 


Carbidopa-Levodopa  mg 1 tab PO 5XD 09/22/20 09/22/20 History





[Sinemet ]    


 


Flecainide [Tambocor] 50 mg PO BID@1200,2330 09/22/20 09/22/20 History


 


Methyl Salicylate/Menthol 1 patch TOPICAL BID 09/22/20 09/22/20 History





[Salonpas Patch]    








                                    Allergies











Allergy/AdvReac Type Severity Reaction Status Date / Time


 


No Known Allergies Allergy   Verified 09/22/20 08:38














Physical Exam


Vitals: 


                                   Vital Signs











  Temp Pulse Pulse Resp BP BP Pulse Ox


 


 09/22/20 08:00  99.3 F   100  16   93/69  92 L


 


 09/22/20 05:35    98  18   


 


 09/22/20 04:55  101.1 F H   98  18   111/66  93 L


 


 09/22/20 04:31  98.7 F  107 H   14  99/69   96


 


 09/22/20 04:00   122 H   14  123/76   94 L


 


 09/22/20 03:43   128 H   18  136/92   95


 


 09/22/20 03:00   126 H   21  116/85  


 


 09/22/20 02:30   142 H   16  100/75  


 


 09/22/20 02:00   110 H   11 L  96/73  


 


 09/22/20 01:41   101 H   16  103/69  


 


 09/22/20 01:30   118 H   23  103/69  


 


 09/22/20 01:00   124 H   41 H   


 


 09/22/20 00:44   140 H   23  103/69  


 


 09/22/20 00:14  99.8 F H  115 H   16  103/69   93 L








                                Intake and Output











 09/21/20 09/22/20 09/22/20





 22:59 06:59 14:59


 


Other:   


 


  Voiding Method  Bedside Commode 





  Diaper 


 


  # Voids  1 


 


  Weight  57.1 kg 














PHYSICAL EXAMINATION: 





GENERAL: 79-year-old  female in no acute distress at the time of my 

examination





HEENT: Head is atraumatic, normocephalic.  Pupils equal, round.  Sclera anicte

melodie. Conjunctiva are clear.  Mucous membranes of the mouth are moist.  Neck is 

supple.  There is no elevated jugular venous pressure.  No carotid  bruit is 

heard.





HEART EXAMINATION: Heart S1 and S2 irregularly irregular a systolic murmur is 

heard





CHEST EXAMINATION: Lungs are clear to auscultation and precussion. No chest wall

tenderness is noted on palpation or with deep breathing.





ABDOMEN:  Soft, nontender. Bowel sounds are heard. No organomegaly noted.


 


EXTREMITIES: 2+ peripheral pulses with no evidence of peripheral edema and no 

calf tenderness noted.





NEUROLOGIC patient is awake, alert and oriented 1.


 


.


 








Results





                                 09/22/20 00:13





                                 09/22/20 00:13


                                 Cardiac Enzymes











  09/22/20 Range/Units





  00:13 


 


AST  35  (14-36)  U/L








                                   Coagulation











  09/22/20 Range/Units





  00:13 


 


PT  11.4  (9.0-12.0)  sec


 


APTT  24.7  (22.0-30.0)  sec








                                       CBC











  09/22/20 Range/Units





  00:13 


 


WBC  11.4 H  (3.8-10.6)  k/uL


 


RBC  4.00  (3.80-5.40)  m/uL


 


Hgb  12.1  (11.4-16.0)  gm/dL


 


Hct  37.3  (34.0-46.0)  %


 


Plt Count  188  (150-450)  k/uL








                          Comprehensive Metabolic Panel











  09/22/20 Range/Units





  00:13 


 


Sodium  136 L  (137-145)  mmol/L


 


Potassium  4.1  (3.5-5.1)  mmol/L


 


Chloride  106  ()  mmol/L


 


Carbon Dioxide  23  (22-30)  mmol/L


 


BUN  30 H  (7-17)  mg/dL


 


Creatinine  0.59  (0.52-1.04)  mg/dL


 


Glucose  122 H  (74-99)  mg/dL


 


Calcium  8.7  (8.4-10.2)  mg/dL


 


AST  35  (14-36)  U/L


 


ALT  9  (4-34)  U/L


 


Alkaline Phosphatase  137 H  ()  U/L


 


Total Protein  6.1 L  (6.3-8.2)  g/dL


 


Albumin  3.5  (3.5-5.0)  g/dL








                               Current Medications











Generic Name Dose Route Start Last Admin





  Trade Name Freq  PRN Reason Stop Dose Admin


 


Acetaminophen/Butalbital/Caffeine  1 each  09/22/20 10:38 





  Butalb/Apap/Caff -40mg Tab  PO  





  TID PRN  





  Migraine Headache  


 


Aspirin  81 mg  09/22/20 21:00 





  Aspirin 81 Mg  PO  





  HS ANJU  


 


Baclofen  10 mg  09/22/20 16:00 





  Baclofen 10 Mg Tab  PO  





  TID ANJU  


 


Carbidopa/Levodopa  1 each  09/22/20 11:00 





  Carbidopa-Levodopa  Mg 1 Each Tab  PO  





  5XD ANJU  


 


Flecainide Acetate  50 mg  09/22/20 12:00 





  Flecainide 50 Mg Tab  PO  





  BID@1200,2330 ANJU  


 


Sodium Chloride  1,000 mls @ 75 mls/hr  09/22/20 03:30  09/22/20 03:30





  Saline 0.9%  IV   75 mls/hr





  .[id number] ANJU   Administration


 


Diltiazem HCl 125 mg/ Sodium  125 mls @ 0 mls/hr  09/22/20 04:00  09/22/20 04:01





  Chloride  IV   5 mg/hr





  .Q0M ANJU   5 mls/hr





    Administration





  Protocol  





  Per Protocol  


 


Ceftriaxone Sodium 1 gm/  50 mls @ 100 mls/hr  09/23/20 09:00 





  Sodium Chloride  IVPB  





  Q24HR ANJU  


 


Metronidazole 500 mg/ IV  100 mls @ 100 mls/hr  09/22/20 14:00 





  Solution  IVPB  





  Q8H ANJU  


 


Naloxone HCl  0.2 mg  09/22/20 03:17 





  Naloxone 0.4 Mg/Ml 1 Ml Vial  IV  





  Q2M PRN  





  Opioid Reversal  


 


Sertraline HCl  50 mg  09/23/20 09:00 





  Sertraline 50 Mg Tab  PO  





  DAILY ANJU  








                                Intake and Output











 09/21/20 09/22/20 09/22/20





 22:59 06:59 14:59


 


Other:   


 


  Voiding Method  Bedside Commode 





  Diaper 


 


  # Voids  1 


 


  Weight  57.1 kg 








                                        





                                 09/22/20 00:13 





                                 09/22/20 00:13 











EKG Interpretations (text)


EKG shows atrial fibrillation with moderately rapid ventricular response








Assessment and Plan


Plan: 


Assessment and plan


#1 symptoms of nausea vomiting and diarrhea, sepsis, possible colitis.  Rule out

Covid 19


#2 paroxysmal atrial fibrillation, on beta blocker and flecainide as an 

outpatient.  She is not currently on anticoagulation because of an ALLERGIC 

reaction to Eliquis in the past


#3 hypertension


#4 mitral valve prolapse


#5 GERD





Plan


We will obtain an echocardiogram with Doppler study as well as a TSH level.  

Resume beta blocker, increasing the dose to 3 times a day.





DNP note has been reviewed, I agree with a documented findings and plan of care.

 Patient was seen and examined.

## 2020-09-23 LAB
ANION GAP SERPL CALC-SCNC: 5 MMOL/L
BUN SERPL-SCNC: 17 MG/DL (ref 7–17)
CALCIUM SPEC-MCNC: 7.7 MG/DL (ref 8.4–10.2)
CHLORIDE SERPL-SCNC: 110 MMOL/L (ref 98–107)
CO2 SERPL-SCNC: 22 MMOL/L (ref 22–30)
ERYTHROCYTE [DISTWIDTH] IN BLOOD BY AUTOMATED COUNT: 3.58 M/UL (ref 3.8–5.4)
ERYTHROCYTE [DISTWIDTH] IN BLOOD: 13.1 % (ref 11.5–15.5)
GLUCOSE SERPL-MCNC: 103 MG/DL (ref 74–99)
HCT VFR BLD AUTO: 34.4 % (ref 34–46)
HGB BLD-MCNC: 10.9 GM/DL (ref 11.4–16)
MCH RBC QN AUTO: 30.4 PG (ref 25–35)
MCHC RBC AUTO-ENTMCNC: 31.6 G/DL (ref 31–37)
MCV RBC AUTO: 96.1 FL (ref 80–100)
PLATELET # BLD AUTO: 128 K/UL (ref 150–450)
POTASSIUM SERPL-SCNC: 3.6 MMOL/L (ref 3.5–5.1)
SODIUM SERPL-SCNC: 137 MMOL/L (ref 137–145)
WBC # BLD AUTO: 7.1 K/UL (ref 3.8–10.6)

## 2020-09-23 RX ADMIN — METOPROLOL TARTRATE SCH MG: 50 TABLET, FILM COATED ORAL at 20:08

## 2020-09-23 RX ADMIN — CARBIDOPA AND LEVODOPA SCH EACH: 25; 100 TABLET ORAL at 23:10

## 2020-09-23 RX ADMIN — CEFAZOLIN SCH: 330 INJECTION, POWDER, FOR SOLUTION INTRAMUSCULAR; INTRAVENOUS at 06:07

## 2020-09-23 RX ADMIN — CARBIDOPA AND LEVODOPA SCH EACH: 25; 100 TABLET ORAL at 05:18

## 2020-09-23 RX ADMIN — BACLOFEN SCH MG: 10 TABLET ORAL at 08:41

## 2020-09-23 RX ADMIN — FLECAINIDE ACETATE SCH MG: 50 TABLET ORAL at 23:10

## 2020-09-23 RX ADMIN — METOPROLOL TARTRATE SCH: 25 TABLET, FILM COATED ORAL at 15:59

## 2020-09-23 RX ADMIN — FLECAINIDE ACETATE SCH MG: 50 TABLET ORAL at 08:42

## 2020-09-23 RX ADMIN — BACLOFEN SCH: 10 TABLET ORAL at 15:59

## 2020-09-23 RX ADMIN — CARBIDOPA AND LEVODOPA SCH: 25; 100 TABLET ORAL at 15:59

## 2020-09-23 RX ADMIN — METRONIDAZOLE SCH MLS/HR: 500 INJECTION, SOLUTION INTRAVENOUS at 12:56

## 2020-09-23 RX ADMIN — SERTRALINE HYDROCHLORIDE SCH MG: 50 TABLET, FILM COATED ORAL at 08:42

## 2020-09-23 RX ADMIN — CARBIDOPA AND LEVODOPA SCH EACH: 25; 100 TABLET ORAL at 20:09

## 2020-09-23 RX ADMIN — METRONIDAZOLE SCH MLS/HR: 500 INJECTION, SOLUTION INTRAVENOUS at 05:18

## 2020-09-23 RX ADMIN — BUTALBITAL, ACETAMINOPHEN, AND CAFFEINE PRN EACH: 50; 325; 40 TABLET ORAL at 16:01

## 2020-09-23 RX ADMIN — CARBIDOPA AND LEVODOPA SCH EACH: 25; 100 TABLET ORAL at 08:42

## 2020-09-23 RX ADMIN — METOPROLOL TARTRATE SCH MG: 25 TABLET, FILM COATED ORAL at 05:19

## 2020-09-23 RX ADMIN — BACLOFEN SCH MG: 10 TABLET ORAL at 21:16

## 2020-09-23 RX ADMIN — BUTALBITAL, ACETAMINOPHEN, AND CAFFEINE PRN EACH: 50; 325; 40 TABLET ORAL at 04:03

## 2020-09-23 RX ADMIN — METRONIDAZOLE SCH MLS/HR: 500 INJECTION, SOLUTION INTRAVENOUS at 21:17

## 2020-09-23 RX ADMIN — BUTALBITAL, ACETAMINOPHEN, AND CAFFEINE PRN EACH: 50; 325; 40 TABLET ORAL at 09:21

## 2020-09-23 RX ADMIN — CEFAZOLIN SCH MLS/HR: 330 INJECTION, POWDER, FOR SOLUTION INTRAMUSCULAR; INTRAVENOUS at 20:07

## 2020-09-23 RX ADMIN — BUTALBITAL, ACETAMINOPHEN, AND CAFFEINE PRN EACH: 50; 325; 40 TABLET ORAL at 20:07

## 2020-09-23 NOTE — P.PN
Subjective


Progress Note Date: 09/23/20





CHIEF COMPLAINT: Rectal prolapse





HISTORY OF PRESENT ILLNESS: Patient lying in bed comfortably.  She does still 

report having some issues with the rectal prolapse and feeling pressure.  

Patient is currently not on oral anticoagulation.  Therefore we will proceed 

proceeding with colonoscopy tomorrow.  Patient is afebrile.  Denies any nausea 

or vomiting.  Diarrhea resolved.  WBC 7.1 hemoglobin 10.9 potassium 3.6





PHYSICAL EXAM: 


VITAL SIGNS: Reviewed.


GENERAL: Well-developed in no acute distress. 


HEENT:  No sclera icterus. Extraocular movements grossly intact.  Moist buccal 

mucosa. Head is atraumatic, normocephalic. 


ABDOMEN:  Soft.  Nondistended. Nontender. 


NEUROLOGIC: Alert and oriented. Cranial nerves II through XII grossly intact.





ASSESSMENT: 


1.  Rectal prolapse


2.  Nausea, vomiting and diarrhea with possible colitis


3.  Paroxysmal atrial fibrillation with episode of rapid ventricular response 

followed by cardiology





PLAN: 


-Patient scheduled for colonoscopy tomorrow with Dr. Trejo


-Start GoLYTELY prep


-Nothing by mouth after midnight


-Hold on starting oral anticoagulation





Physician Assistant note has been reviewed by physician. Signing provider agrees

with the documented findings, assessment, and plan of care. 





Objective





- Vital Signs


Vital signs: 


                                   Vital Signs











Temp  98.5 F   09/23/20 08:00


 


Pulse  124 H  09/23/20 11:39


 


Resp  16   09/23/20 11:39


 


BP  149/101   09/23/20 08:00


 


Pulse Ox  93 L  09/23/20 08:00








                                 Intake & Output











 09/22/20 09/23/20 09/23/20





 18:59 06:59 18:59


 


Intake Total 240  520


 


Output Total 200 200 


 


Balance 40 -200 520


 


Weight  57.6 kg 


 


Intake:   


 


  Oral 240  520


 


Output:   


 


  Urine 200 200 


 


Other:   


 


  Voiding Method Bedside Commode Bedside Commode Bedside Commode





 Diaper  


 


  # Voids  1 1


 


  # Bowel Movements  1 1














- Labs


CBC & Chem 7: 


                                 09/23/20 07:32





                                 09/23/20 07:32


Labs: 


                  Abnormal Lab Results - Last 24 Hours (Table)











  09/23/20 09/23/20 Range/Units





  07:32 07:32 


 


RBC  3.58 L   (3.80-5.40)  m/uL


 


Hgb  10.9 L   (11.4-16.0)  gm/dL


 


Plt Count  128 L   (150-450)  k/uL


 


Chloride   110 H  ()  mmol/L


 


Glucose   103 H  (74-99)  mg/dL


 


Calcium   7.7 L  (8.4-10.2)  mg/dL








                      Microbiology - Last 24 Hours (Table)











 09/22/20 01:27 Blood Culture - Preliminary





 Blood    No Growth after 24 hours

## 2020-09-23 NOTE — P.PN
Subjective


Progress Note Date: 09/23/20





This is an 79-year-old  female with documented history of Parkinson's 

disease, hypertension, mitral valve prolapse, paroxysmal atrial fibrillation, 

she follows with Dr. Villar in the office.  Patient had been on 

anticoagulantion in the past on Eliquis, she had an ALLERGIC reaction to this, 

which is why she's not currently on anticoagulation.  History was obtained from 

the medical record as the patient appears to be confused, apparently the 

daughter gave most of the history.  Patient presented to the hospital with 

symptoms of nausea, vomiting, and diarrhea for 24 hour duration.  She was also 

experiencing some abdominal cramping and low-grade fever.  Her EKG on 

presentation here showed atrial fibrillation with moderately rapid ventricular 

response.  CT of the abdomen and pelvis showed minimal free fluid in the 

abdomen.  Her temperature has been up to 101.1 here, this morning 99.3.  White 

blood cell count 11.4, hemoglobin 12.1, platelet count 188.  Sodium 136, 

potassium 4.1, BUN 30, creatinine 0.5.  Blood pressure 93/60 with a heart rate 

in the 192% on room air.  She was given a Cardizem bolus as well as one dose of 

IV Lopressor on admission here.  Her home medications include Tenormin 25 mg 

twice a day, flat and I 50 twice a day, Sinemet, failure status, aspirin, and 

Zoloft.








Patient seen and examined this morning, much more alert and oriented today.  She

states she is no longer having loose stools.  Blood pressure 137/80 with a heart

rate in the 90s to low 120s.  White blood cell count 7.1, hemoglobin 10.9, 

platelet count 128.  Sodium 137, potassium 3.6, BUN 17, creatinine 0.5.  TSH is 

normal.  An echocardiogram with Doppler study was performed which revealed an 

ejection fraction of 45-50%.  She continues to be in atrial fibrillation which 

is chronic.  I spoke with the patient at length this morning regarding 

anticoagulation.  She did have an ALLERGIC reaction to Eliquis but stress test 

trying xarelto.  Explaining her risk for stroke overall being high.  She will 

speak with her daughter in detail about this, and I will follow-up with her 

later on today.














Objective





- Vital Signs


Vital signs: 


                                   Vital Signs











Temp  98.5 F   09/23/20 08:00


 


Pulse  124 H  09/23/20 08:00


 


Resp  16   09/23/20 08:00


 


BP  149/101   09/23/20 08:00


 


Pulse Ox  93 L  09/23/20 08:00








                                 Intake & Output











 09/22/20 09/23/20 09/23/20





 18:59 06:59 18:59


 


Intake Total 240  520


 


Output Total 200 200 


 


Balance 40 -200 520


 


Weight  57.6 kg 


 


Intake:   


 


  Oral 240  520


 


Output:   


 


  Urine 200 200 


 


Other:   


 


  Voiding Method Bedside Commode Bedside Commode Bedside Commode





 Diaper  


 


  # Voids  1 1


 


  # Bowel Movements  1 1














- Exam








PHYSICAL EXAMINATION: 





GENERAL: 79-year-old  female in no acute distress at the time of my 

examination





HEENT: Head is atraumatic, normocephalic.  Pupils equal, round.  Sclera 

anicteric. Conjunctiva are clear.  Mucous membranes of the mouth are moist.  

Neck is supple.  There is no elevated jugular venous pressure.  No carotid  

bruit is heard.





HEART EXAMINATION: Heart S1 and S2 irregularly irregular a systolic murmur is he

gautam





CHEST EXAMINATION: Lungs are clear to auscultation and precussion. No chest wall

tenderness is noted on palpation or with deep breathing.





ABDOMEN:  Soft, nontender. Bowel sounds are heard. No organomegaly noted.


 


EXTREMITIES: 2+ peripheral pulses with no evidence of peripheral edema and no ca

lf tenderness noted.





NEUROLOGIC patient is awake, alert and oriented 1.





- Labs


CBC & Chem 7: 


                                 09/23/20 07:32





                                 09/23/20 07:32


Labs: 


                  Abnormal Lab Results - Last 24 Hours (Table)











  09/23/20 09/23/20 Range/Units





  07:32 07:32 


 


RBC  3.58 L   (3.80-5.40)  m/uL


 


Hgb  10.9 L   (11.4-16.0)  gm/dL


 


Plt Count  128 L   (150-450)  k/uL


 


Chloride   110 H  ()  mmol/L


 


Glucose   103 H  (74-99)  mg/dL


 


Calcium   7.7 L  (8.4-10.2)  mg/dL








                      Microbiology - Last 24 Hours (Table)











 09/22/20 01:27 Blood Culture - Preliminary





 Blood    No Growth after 24 hours














Assessment and Plan


Plan: 


Assessment and plan


#1 symptoms of nausea vomiting and diarrhea, sepsis, possible colitis.  Rule out

Covid 19


#2 paroxysmal atrial fibrillation, on beta blocker and flecainide as an 

outpatient.  She is not currently on anticoagulation because of an ALLERGIC 

reaction to Eliquis in the past


#3 hypertension


#4 mitral valve prolapse


#5 GERD





Plan


We will increase the metoprolol to 50 mg by mouth twice a day.  He also had a 

lengthy discussion with the patient regarding initiating xarelto 15 mg daily.  

She did have an ALLERGIC reaction to Eliquis in the past but needs to be on 

anticoagulation for stroke prevention.  She is going to speak with her daughter 

about this and we will touch base again later today.  Further recommendations 

patient will then follow.





DNP note has been reviewed, I agree with a documented findings and plan of care.

 Patient was seen and examined.

## 2020-09-23 NOTE — P.PN
Subjective





From records:


The patient is a pleasant 79-year-old female came in now with the complaints of 

diarrhea watery with diffuse abdominal cramping restarted yesterday patient 

developed had fever last night.  Patient's abdominal pain is cramping in sensa

tion.  Patient was told patient may have rectal prolapse by family members 

unsure whether it's hemorrhoid coming out of the rectum.  Patient also found to 

be in atrial fibrillation with rapid ventricular rate was started on Cardizem 

and do not have any previous echocardiogram available at this time.  Patient 

denied any cough patient denied any URI symptoms.  Patient was exposed to sick 

contacts and had a recent general also has diarrhea.





Subjective:


2020, this is a first time taking care of the patient


Patient is lying Comfortable, she denies chest pain or dyspnea, no abdominal 

pain, she was able to eat little bit only she had nausea but no vomiting.  

Currently she has no diarrhea and shows a regular bowel movement


Patient she is to follow up with her cardiologist Dr. Benedict, patient was not 

on Eliquis which give her facial swelling, she is not on any blood thinner.  

Patient also found to have acute sigmoid diverticulitis and she is going for 

colonoscopy tomorrow and after that patient most likely will be started on 

xarelto 





Review of systems


CONSTITUTIONAL: No fever, no malaise, no fatigue. 


HEENT: No recent visual problems or hearing problems. Denied any sore throat. 


CARDIOVASCULAR: No  orthopnea, PND, no palpitations, no syncope. 


PULMONARY: No shortness of breath, no cough, no hemoptysis. 


GASTROINTESTINAL: No diarrhea, no nausea, no vomiting, no abdominal pain. N

ormoactive bowel sounds. 


NEUROLOGICAL: No headaches, no weakness, no numbness. 








Active Medications











Generic Name Dose Route Start Last Admin





  Trade Name Freq  PRN Reason Stop Dose Admin


 


Acetaminophen/Butalbital/Caffeine  1 each  20 10:38  20 09:21





  Butalb/Apap/Caff -40mg Tab  PO   1 each





  TID PRN   Administration





  Migraine Headache  


 


Baclofen  10 mg  20 16:00  20 08:41





  Baclofen 10 Mg Tab  PO   10 mg





  TID ANJU   Administration


 


Carbidopa/Levodopa  1 each  20 11:00  20 08:42





  Carbidopa-Levodopa  Mg 1 Each Tab  PO   1 each





  5XD ANJU   Administration


 


Flecainide Acetate  50 mg  20 12:00  20 08:42





  Flecainide 50 Mg Tab  PO   50 mg





  BID@1200,2330 ANJU   Administration


 


Sodium Chloride  1,000 mls @ 75 mls/hr  20 03:30  20 06:07





  Saline 0.9%  IV   Not Given





  .[id number] ANJU  


 


Diltiazem HCl 125 mg/ Sodium  125 mls @ 0 mls/hr  20 04:00  20 04:01





  Chloride  IV   5 mg/hr





  .Q0M ANJU   5 mls/hr





    Administration





  Protocol  





  Per Protocol  


 


Ceftriaxone Sodium 1 gm/  50 mls @ 100 mls/hr  20 09:00  20 08:42





  Sodium Chloride  IVPB   100 mls/hr





  Q24HR ANJU   Administration


 


Metronidazole 500 mg/ IV  100 mls @ 100 mls/hr  20 14:00  20 05:18





  Solution  IVPB   100 mls/hr





  Q8H ANJU   Administration


 


Metoprolol Tartrate  25 mg  20 12:00  20 05:19





  Metoprolol Tartrate 25 Mg Tab  PO   25 mg





  TID ANJU   Administration


 


Naloxone HCl  0.2 mg  20 03:17 





  Naloxone 0.4 Mg/Ml 1 Ml Vial  IV  





  Q2M PRN  





  Opioid Reversal  


 


Polyethylene Glycol/Electrolytes  4,000 ml  20 13:00 





  Peg 3350-Na Sulf,Bicarb,Cl/Kcl 4,000 Ml Bottle  PO  20 13:01 





  ONCE ONE  


 


Sertraline HCl  50 mg  20 09:00  20 08:42





  Sertraline 50 Mg Tab  PO   50 mg





  DAILY ANJU   Administration














Objective





- Vital Signs


Vital signs: 


                                   Vital Signs











Temp  98.5 F   20 08:00


 


Pulse  124 H  20 11:39


 


Resp  16   20 11:39


 


BP  149/101   20 08:00


 


Pulse Ox  93 L  20 08:00








                                 Intake & Output











 20





 18:59 06:59 18:59


 


Intake Total 240  520


 


Output Total 200 200 


 


Balance 40 -200 520


 


Weight  57.6 kg 


 


Intake:   


 


  Oral 240  520


 


Output:   


 


  Urine 200 200 


 


Other:   


 


  Voiding Method Bedside Commode Bedside Commode Bedside Commode





 Diaper  


 


  # Voids  1 1


 


  # Bowel Movements  1 1














- Exam





-Possible acute sigmoid colitis only mild colitis and the CT.  C. diff for 

colitis and it to be ruled out as well.  Continue with Rocephin and 

metronidazole for now.  We'll also rule out covid 19 considering recent visit to

 and no clear-cut source of infection although infectious colitis can be 

the source.  Plan for colonoscopy.


-Atrial fibrillation: New-onset patient was started on Cardizem, start 

anticoagulation and beta blocker as per cardiologist


-Gastroesophageal reflux disease


-Hypertension


-History of mitral valve prolapse


-If patient is not started on any anticoagulation with cardiology I'll start her

on dvt prophylaxis








- Labs


CBC & Chem 7: 


                                 20 07:32





                                 20 07:32


Labs: 


                  Abnormal Lab Results - Last 24 Hours (Table)











  20 Range/Units





  07:32 07:32 


 


RBC  3.58 L   (3.80-5.40)  m/uL


 


Hgb  10.9 L   (11.4-16.0)  gm/dL


 


Plt Count  128 L   (150-450)  k/uL


 


Chloride   110 H  ()  mmol/L


 


Glucose   103 H  (74-99)  mg/dL


 


Calcium   7.7 L  (8.4-10.2)  mg/dL








                      Microbiology - Last 24 Hours (Table)











 20 01:27 Blood Culture - Preliminary





 Blood    No Growth after 24 hours

## 2020-09-24 VITALS — TEMPERATURE: 97.5 F

## 2020-09-24 LAB
ANION GAP SERPL CALC-SCNC: 5 MMOL/L
BUN SERPL-SCNC: 12 MG/DL (ref 7–17)
CALCIUM SPEC-MCNC: 7.8 MG/DL (ref 8.4–10.2)
CHLORIDE SERPL-SCNC: 108 MMOL/L (ref 98–107)
CO2 SERPL-SCNC: 25 MMOL/L (ref 22–30)
GLUCOSE SERPL-MCNC: 103 MG/DL (ref 74–99)
POTASSIUM SERPL-SCNC: 3.1 MMOL/L (ref 3.5–5.1)
SODIUM SERPL-SCNC: 138 MMOL/L (ref 137–145)

## 2020-09-24 RX ADMIN — CARBIDOPA AND LEVODOPA SCH EACH: 25; 100 TABLET ORAL at 06:10

## 2020-09-24 RX ADMIN — METRONIDAZOLE SCH MLS/HR: 500 INJECTION, SOLUTION INTRAVENOUS at 12:40

## 2020-09-24 RX ADMIN — POTASSIUM CHLORIDE SCH MEQ: 20 TABLET, EXTENDED RELEASE ORAL at 12:40

## 2020-09-24 RX ADMIN — BUTALBITAL, ACETAMINOPHEN, AND CAFFEINE PRN EACH: 50; 325; 40 TABLET ORAL at 17:11

## 2020-09-24 RX ADMIN — BUTALBITAL, ACETAMINOPHEN, AND CAFFEINE PRN EACH: 50; 325; 40 TABLET ORAL at 00:19

## 2020-09-24 RX ADMIN — FLECAINIDE ACETATE SCH MG: 50 TABLET ORAL at 08:38

## 2020-09-24 RX ADMIN — RIVAROXABAN SCH MG: 15 TABLET, FILM COATED ORAL at 12:43

## 2020-09-24 RX ADMIN — POTASSIUM CHLORIDE SCH: 20 TABLET, EXTENDED RELEASE ORAL at 13:04

## 2020-09-24 RX ADMIN — CARBIDOPA AND LEVODOPA SCH EACH: 25; 100 TABLET ORAL at 23:08

## 2020-09-24 RX ADMIN — METOPROLOL TARTRATE SCH MG: 50 TABLET, FILM COATED ORAL at 17:12

## 2020-09-24 RX ADMIN — CARBIDOPA AND LEVODOPA SCH EACH: 25; 100 TABLET ORAL at 21:42

## 2020-09-24 RX ADMIN — CEFAZOLIN SCH: 330 INJECTION, POWDER, FOR SOLUTION INTRAMUSCULAR; INTRAVENOUS at 21:32

## 2020-09-24 RX ADMIN — BUTALBITAL, ACETAMINOPHEN, AND CAFFEINE PRN EACH: 50; 325; 40 TABLET ORAL at 08:42

## 2020-09-24 RX ADMIN — POTASSIUM CHLORIDE SCH: 20 TABLET, EXTENDED RELEASE ORAL at 16:17

## 2020-09-24 RX ADMIN — BACLOFEN SCH MG: 10 TABLET ORAL at 17:12

## 2020-09-24 RX ADMIN — CARBIDOPA AND LEVODOPA SCH EACH: 25; 100 TABLET ORAL at 12:40

## 2020-09-24 RX ADMIN — BACLOFEN SCH MG: 10 TABLET ORAL at 08:37

## 2020-09-24 RX ADMIN — CEFAZOLIN SCH MLS/HR: 330 INJECTION, POWDER, FOR SOLUTION INTRAMUSCULAR; INTRAVENOUS at 21:44

## 2020-09-24 RX ADMIN — METOPROLOL TARTRATE SCH MG: 50 TABLET, FILM COATED ORAL at 21:42

## 2020-09-24 RX ADMIN — SERTRALINE HYDROCHLORIDE SCH MG: 50 TABLET, FILM COATED ORAL at 08:36

## 2020-09-24 RX ADMIN — METOPROLOL TARTRATE SCH MG: 50 TABLET, FILM COATED ORAL at 08:36

## 2020-09-24 RX ADMIN — METRONIDAZOLE SCH MLS/HR: 500 INJECTION, SOLUTION INTRAVENOUS at 21:43

## 2020-09-24 RX ADMIN — FLECAINIDE ACETATE SCH MG: 50 TABLET ORAL at 23:07

## 2020-09-24 RX ADMIN — BACLOFEN SCH MG: 10 TABLET ORAL at 21:42

## 2020-09-24 RX ADMIN — METRONIDAZOLE SCH MLS/HR: 500 INJECTION, SOLUTION INTRAVENOUS at 06:09

## 2020-09-24 RX ADMIN — CARBIDOPA AND LEVODOPA SCH EACH: 25; 100 TABLET ORAL at 17:11

## 2020-09-24 NOTE — P.PN
Subjective


Progress Note Date: 09/24/20





This is an 79-year-old  female with documented history of Parkinson's 

disease, hypertension, mitral valve prolapse, paroxysmal atrial fibrillation, 

she follows with Dr. Villar in the office.  Patient had been on 

anticoagulantion in the past on Eliquis, she had an ALLERGIC reaction to this, 

which is why she's not currently on anticoagulation.  History was obtained from 

the medical record as the patient appears to be confused, apparently the 

daughter gave most of the history.  Patient presented to the hospital with 

symptoms of nausea, vomiting, and diarrhea for 24 hour duration.  She was also 

experiencing some abdominal cramping and low-grade fever.  Her EKG on 

presentation here showed atrial fibrillation with moderately rapid ventricular 

response.  CT of the abdomen and pelvis showed minimal free fluid in the 

abdomen.  Her temperature has been up to 101.1 here, this morning 99.3.  White 

blood cell count 11.4, hemoglobin 12.1, platelet count 188.  Sodium 136, 

potassium 4.1, BUN 30, creatinine 0.5.  Blood pressure 93/60 with a heart rate 

in the 192% on room air.  She was given a Cardizem bolus as well as one dose of 

IV Lopressor on admission here.  Her home medications include Tenormin 25 mg 

twice a day, flat and I 50 twice a day, Sinemet, failure status, aspirin, and 

Zoloft.








Patient seen and examined this morning, much more alert and oriented today.  She

states she is no longer having loose stools.  Blood pressure 137/80 with a heart

rate in the 90s to low 120s.  White blood cell count 7.1, hemoglobin 10.9, 

platelet count 128.  Sodium 137, potassium 3.6, BUN 17, creatinine 0.5.  TSH is 

normal.  An echocardiogram with Doppler study was performed which revealed an 

ejection fraction of 45-50%.  She continues to be in atrial fibrillation which 

is chronic.  I spoke with the patient at length this morning regarding 

anticoagulation.  She did have an ALLERGIC reaction to Eliquis but stress test 

trying xarelto.  Explaining her risk for stroke overall being high.  She will 

speak with her daughter in detail about this, and I will follow-up with her 

later on today.








09/24/2020


Patient was seen and examined this morning, she is refusing to have any 

procedures done, refusing colonoscopy.  Just wants to continue medical therapy. 

She feels very weak this morning but is still on clear liquids.  Blood pressure 

136/80 with a heart rate in the 100s to 1 teens, 94% on room air.  Sodium 138, 

potassium 3.1, BUN 12, creatinine 0.5.





Objective





- Vital Signs


Vital signs: 


                                   Vital Signs











Temp  98.3 F   09/24/20 08:00


 


Pulse  99   09/24/20 08:00


 


Resp  16   09/24/20 08:00


 


BP  136/85   09/24/20 08:00


 


Pulse Ox  94 L  09/24/20 08:00








                                 Intake & Output











 09/23/20 09/24/20 09/24/20





 18:59 06:59 18:59


 


Intake Total 940  0


 


Output Total   200


 


Balance 940  -200


 


Weight  58.7 kg 


 


Intake:   


 


  Oral 940  0


 


Output:   


 


  Urine   200


 


Other:   


 


  Voiding Method Bedside Commode Bedside Commode Bedside Commode


 


  # Voids 1 1 


 


  # Bowel Movements 7  1














- Exam








PHYSICAL EXAMINATION: 





GENERAL: 79-year-old  female in no acute distress at the time of my 

examination





HEENT: Head is atraumatic, normocephalic.  Pupils equal, round.  Sclera 

anicteric. Conjunctiva are clear.  Mucous membranes of the mouth are moist.  

Neck is supple.  There is no elevated jugular venous pressure.  No carotid  

bruit is heard.





HEART EXAMINATION: Heart S1 and S2 irregularly irregular a systolic murmur is 

heard





CHEST EXAMINATION: Lungs are clear to auscultation and precussion. No chest wall

tenderness is noted on palpation or with deep breathing.





ABDOMEN:  Soft, nontender. Bowel sounds are heard. No organomegaly noted.


 


EXTREMITIES: 2+ peripheral pulses with no evidence of peripheral edema and no 

calf tenderness noted.





NEUROLOGIC patient is awake, alert and oriented 3.





- Labs


CBC & Chem 7: 


                                 09/23/20 07:32





                                 09/24/20 07:52


Labs: 


                  Abnormal Lab Results - Last 24 Hours (Table)











  09/24/20 Range/Units





  07:52 


 


Potassium  3.1 L  (3.5-5.1)  mmol/L


 


Chloride  108 H  ()  mmol/L


 


Glucose  103 H  (74-99)  mg/dL


 


Calcium  7.8 L  (8.4-10.2)  mg/dL








                      Microbiology - Last 24 Hours (Table)











 09/22/20 01:27 Blood Culture - Preliminary





 Blood    No Growth after 48 hours














Assessment and Plan


Plan: 


Assessment and plan


#1 symptoms of nausea vomiting and diarrhea, sepsis, possible colitis.  Rule out

Covid 19


#2 paroxysmal atrial fibrillation, on beta blocker and flecainide as an 

outpatient.  She is not currently on anticoagulation because of an ALLERGIC 

reaction to Eliquis in the past


#3 hypertension


#4 mitral valve prolapse


#5 GERD





Plan


We will increase the metoprolol to 50 mg by mouth 3 times a day .  He also had a

lengthy discussion with the patient regarding initiating xarelto 15 mg daily.  

She did have an ALLERGIC reaction to Eliquis in the past but needs to be on 

anticoagulation for stroke prevention.  She is going to speak with her daughter 

about this and we will touch base again later today.  I will also give the 

daughter called regarding the initiation of Xarelto.  Further recommendations 

patient will then follow.





DNP note has been reviewed, I agree with a documented findings and plan of care.

 Patient was seen and examined.

## 2020-09-24 NOTE — P.PN
Subjective





From records:


The patient is a pleasant 79-year-old female came in now with the complaints of 

diarrhea watery with diffuse abdominal cramping restarted yesterday patient 

developed had fever last night.  Patient's abdominal pain is cramping in sensa

tion.  Patient was told patient may have rectal prolapse by family members 

unsure whether it's hemorrhoid coming out of the rectum.  Patient also found to 

be in atrial fibrillation with rapid ventricular rate was started on Cardizem 

and do not have any previous echocardiogram available at this time.  Patient 

denied any cough patient denied any URI symptoms.  Patient was exposed to sick 

contacts and had a recent general also has diarrhea.





Subjective:


2020, this is a first time taking care of the patient


Patient is lying Comfortable, she denies chest pain or dyspnea, no abdominal 

pain, she was able to eat little bit only she had nausea but no vomiting.  

Currently she has no diarrhea and shows a regular bowel movement


Patient she is to follow up with her cardiologist Dr. Benedict, patient was not 

on Eliquis which give her facial swelling, she is not on any blood thinner.  

Patient also found to have acute sigmoid diverticulitis and she is going for 

colonoscopy tomorrow and after that patient most likely will be started on 

xarelto 





2020


Patient was awakened alert, she was still feeling weak, she has low appetite and

she couldn't finish only 25-50% of her diet, she has little diarrhea yesterday 

and last night however this morning was normal as per patient.  Patient refused 

colonoscopy and she wants to be done as an outpatient, so it was counseled by 

surgery team.  Cardiologist also following the patient for her A. fib and they 

started on Xarelto.  Patient felt some dizziness which is controlled with 

medication.  Keep monitoring and following the patient closely.





Review of systems


CONSTITUTIONAL: No fever, no malaise, no fatigue. 


HEENT: No recent visual problems or hearing problems. Denied any sore throat. 


CARDIOVASCULAR: No  orthopnea, PND, no palpitations, no syncope. 


PULMONARY: No shortness of breath, no cough, no hemoptysis. 


GASTROINTESTINAL: No diarrhea, no nausea, no vomiting, no abdominal pain. 

Normoactive bowel sounds. 


NEUROLOGICAL: No headaches, no weakness, no numbness. 








                               Active Medications











Generic Name Dose Route Start Last Admin





  Trade Name Freq  PRN Reason Stop Dose Admin


 


Acetaminophen/Butalbital/Caffeine  1 each  20 10:38  20 17:11





  Butalb/Apap/Caff -40mg Tab  PO   1 each





  TID PRN   Administration





  Migraine Headache  


 


Alprazolam  0.25 mg  20 17:55 





  Alprazolam 0.25 Mg Tab  PO  





  QID PRN  





  Anxiety  


 


Baclofen  10 mg  20 16:00  20 21:42





  Baclofen 10 Mg Tab  PO   10 mg





  TID ANJU   Administration


 


Carbidopa/Levodopa  1 each  20 11:00  20 23:08





  Carbidopa-Levodopa  Mg 1 Each Tab  PO   1 each





  5XD ANJU   Administration


 


Flecainide Acetate  50 mg  20 12:00  20 23:07





  Flecainide 50 Mg Tab  PO   50 mg





  BID@1200,2330 ANJU   Administration


 


Sodium Chloride  1,000 mls @ 75 mls/hr  20 03:30  20 21:44





  Saline 0.9%  IV   75 mls/hr





  .[id number] ANJU   Administration


 


Ceftriaxone Sodium 1 gm/  50 mls @ 100 mls/hr  20 09:00  20 08:37





  Sodium Chloride  IVPB   100 mls/hr





  Q24HR ANJU   Administration


 


Metronidazole 500 mg/ IV  100 mls @ 100 mls/hr  20 14:00  20 21:43





  Solution  IVPB   100 mls/hr





  Q8H ANJU   Administration


 


Metoprolol Tartrate  50 mg  20 16:00  20 21:42





  Metoprolol Tartrate 50 Mg Tab  PO   50 mg





  TID ANJU   Administration


 


Miscellaneous Information  1 each  20 08:23 





  Magnesium Replacement Protocol 1 Each Misc  MISCELLANE  





  DAILY PRN  





  Per Protocol  





  Protocol  


 


Miscellaneous Information  1 each  20 11:59 





  Potassium Replacement Protocol 1 Each Misc  MISCELLANE  





  DAILY PRN  





  Per Protocol  





  Protocol  


 


Naloxone HCl  0.2 mg  20 03:17 





  Naloxone 0.4 Mg/Ml 1 Ml Vial  IV  





  Q2M PRN  





  Opioid Reversal  


 


Rivaroxaban  15 mg  20 12:15  20 12:43





  Rivaroxaban 15 Mg Tab  PO   15 mg





  DAILY ANJU   Administration


 


Sertraline HCl  50 mg  20 09:00  20 08:36





  Sertraline 50 Mg Tab  PO   50 mg





  DAILY ANJU   Administration














Objective





- Vital Signs


Vital signs: 


                                   Vital Signs











Temp  98.3 F   20 08:00


 


Pulse  117 H  20 12:00


 


Resp  16   20 16:00


 


BP  134/86   20 12:00


 


Pulse Ox  95   20 12:00








                                 Intake & Output











 20





 18:59 06:59 18:59


 


Intake Total 940  250


 


Output Total   400


 


Balance 940  -150


 


Weight  58.7 kg 


 


Intake:   


 


  Oral 940  250


 


Output:   


 


  Urine   400


 


Other:   


 


  Voiding Method Bedside Commode Bedside Commode Bedside Commode


 


  # Voids 1 1 1


 


  # Bowel Movements 7  2














- Exam





-Possible acute sigmoid colitis only mild colitis and the CT.  C. diff for 

colitis and it to be ruled out as well.  Continue with Rocephin and 

metronidazole for now.  We'll also rule out covid 19 considering recent visit to

 and no clear-cut source of infection although infectious colitis can be 

the source.  Plan for colonoscopy.


-Atrial fibrillation: New-onset patient was started on Cardizem, start 

anticoagulation and beta blocker as per cardiologist


-Gastroesophageal reflux disease


-Hypertension


-History of mitral valve prolapse


-If patient is not started on any anticoagulation with cardiology I'll start her

on dvt prophylaxis








- Labs


CBC & Chem 7: 


                                 20 07:32





                                 20 07:52


Labs: 


                  Abnormal Lab Results - Last 24 Hours (Table)











  20 Range/Units





  07:52 


 


Potassium  3.1 L  (3.5-5.1)  mmol/L


 


Chloride  108 H  ()  mmol/L


 


Glucose  103 H  (74-99)  mg/dL


 


Calcium  7.8 L  (8.4-10.2)  mg/dL








                      Microbiology - Last 24 Hours (Table)











 20 01:27 Blood Culture - Preliminary





 Blood    No Growth after 48 hours














Assessment and Plan


Assessment: 





-Sepsis: Possibility of colitis only mild colitis and the CT.  continue with 

antibiotics.  Follow-up with surgery for outpatient colonoscopy as patient 

refused inpatient colonoscopy


-Atrial fibrillation: New-onset patient.  Follow-up cardiologist recommendation.

 Patient is a started on Xarelto.  Rate is controlled.


-Generalized weakness, physical therapy recommended [facility] for subacute rehab


-Gastroesophageal reflux disease


-Hypertension


-History of mitral valve prolapse


DVT prophylaxis: Xarelto

## 2020-09-24 NOTE — P.PN
Progress Note - Text


Progress Note Date: 09/24/20


This is an addendum to the progress note dictated today, I spoke with the 

patient's daughter today regarding initiating Xarelto for anticoagulation.  The 

daughter is aware that the patient had an ALLERGIC reaction to Eliquis in the 

past but agrees to the patient being started on xarelto.  We will start her on 

xarelto 15 mg by mouth daily today continue to monitor the patient for 24 hours.

 The plan is for her to be transferred to an extended care facility tomorrow.





DNP note has been reviewed, I agree with a documented findings and plan of care.

 Patient was seen and examined.

## 2020-09-24 NOTE — P.PN
Subjective


Progress Note Date: 09/24/20





CHIEF COMPLAINT: Rectal prolapse





HISTORY OF PRESENT ILLNESS:  seen with Dr. Trejo.  Patient lying in bed 

comfortably.  She does still report having some issues with the rectal prolapse 

and feeling pressure.  Patient's family did not want to proceed with colonoscopy

at this time and wants to do an outpatient.  Colonoscopy has been canceled.  

Patient is afebrile.  Denies any nausea or vomiting.  Potassium 3.1, potassium 

is being replaced.  magnesium 1.9





PHYSICAL EXAM: 


VITAL SIGNS: Reviewed.


GENERAL: Well-developed in no acute distress. 


HEENT:  No sclera icterus. Extraocular movements grossly intact.  Moist buccal 

mucosa. Head is atraumatic, normocephalic. 


ABDOMEN:  Soft.  Nondistended. Nontender. 


NEUROLOGIC: Alert and oriented. Cranial nerves II through XII grossly intact.





ASSESSMENT: 


1.  Rectal prolapse


2.  Nausea, vomiting and diarrhea with possible colitis


3.  Paroxysmal atrial fibrillation with episode of rapid ventricular response 

followed by cardiology


4.  Hypokalemia





PLAN: 


-Recommend colonoscopy outpatient when stable





Physician Assistant note has been reviewed by physician. Signing provider agrees

with the documented findings, assessment, and plan of care. 





Objective





- Vital Signs


Vital signs: 


                                   Vital Signs











Temp  98.3 F   09/24/20 08:00


 


Pulse  117 H  09/24/20 12:00


 


Resp  16   09/24/20 12:00


 


BP  134/86   09/24/20 12:00


 


Pulse Ox  95   09/24/20 12:00








                                 Intake & Output











 09/23/20 09/24/20 09/24/20





 18:59 06:59 18:59


 


Intake Total 940  0


 


Output Total   400


 


Balance 940  -400


 


Weight  58.7 kg 


 


Intake:   


 


  Oral 940  0


 


Output:   


 


  Urine   400


 


Other:   


 


  Voiding Method Bedside Commode Bedside Commode Bedside Commode


 


  # Voids 1 1 1


 


  # Bowel Movements 7  1














- Labs


CBC & Chem 7: 


                                 09/23/20 07:32





                                 09/24/20 07:52


Labs: 


                  Abnormal Lab Results - Last 24 Hours (Table)











  09/24/20 Range/Units





  07:52 


 


Potassium  3.1 L  (3.5-5.1)  mmol/L


 


Chloride  108 H  ()  mmol/L


 


Glucose  103 H  (74-99)  mg/dL


 


Calcium  7.8 L  (8.4-10.2)  mg/dL








                      Microbiology - Last 24 Hours (Table)











 09/22/20 01:27 Blood Culture - Preliminary





 Blood    No Growth after 48 hours

## 2020-09-25 VITALS — SYSTOLIC BLOOD PRESSURE: 104 MMHG | RESPIRATION RATE: 16 BRPM | DIASTOLIC BLOOD PRESSURE: 53 MMHG | HEART RATE: 63 BPM

## 2020-09-25 LAB
ANION GAP SERPL CALC-SCNC: 7 MMOL/L
BASOPHILS # BLD AUTO: 0 K/UL (ref 0–0.2)
BASOPHILS NFR BLD AUTO: 0 %
BUN SERPL-SCNC: 13 MG/DL (ref 7–17)
CALCIUM SPEC-MCNC: 8.1 MG/DL (ref 8.4–10.2)
CHLORIDE SERPL-SCNC: 108 MMOL/L (ref 98–107)
CO2 SERPL-SCNC: 24 MMOL/L (ref 22–30)
EOSINOPHIL # BLD AUTO: 0.1 K/UL (ref 0–0.7)
EOSINOPHIL NFR BLD AUTO: 1 %
ERYTHROCYTE [DISTWIDTH] IN BLOOD BY AUTOMATED COUNT: 3.9 M/UL (ref 3.8–5.4)
ERYTHROCYTE [DISTWIDTH] IN BLOOD: 13.7 % (ref 11.5–15.5)
GLUCOSE SERPL-MCNC: 124 MG/DL (ref 74–99)
HCT VFR BLD AUTO: 36.9 % (ref 34–46)
HGB BLD-MCNC: 12.5 GM/DL (ref 11.4–16)
LYMPHOCYTES # SPEC AUTO: 0.6 K/UL (ref 1–4.8)
LYMPHOCYTES NFR SPEC AUTO: 8 %
MAGNESIUM SPEC-SCNC: 1.8 MG/DL (ref 1.6–2.3)
MCH RBC QN AUTO: 32 PG (ref 25–35)
MCHC RBC AUTO-ENTMCNC: 33.8 G/DL (ref 31–37)
MCV RBC AUTO: 94.6 FL (ref 80–100)
MONOCYTES # BLD AUTO: 0.6 K/UL (ref 0–1)
MONOCYTES NFR BLD AUTO: 8 %
NEUTROPHILS # BLD AUTO: 6.5 K/UL (ref 1.3–7.7)
NEUTROPHILS NFR BLD AUTO: 82 %
PLATELET # BLD AUTO: 192 K/UL (ref 150–450)
POTASSIUM SERPL-SCNC: 3.3 MMOL/L (ref 3.5–5.1)
SODIUM SERPL-SCNC: 139 MMOL/L (ref 137–145)
WBC # BLD AUTO: 7.9 K/UL (ref 3.8–10.6)

## 2020-09-25 RX ADMIN — BUTALBITAL, ACETAMINOPHEN, AND CAFFEINE PRN EACH: 50; 325; 40 TABLET ORAL at 16:50

## 2020-09-25 RX ADMIN — BUTALBITAL, ACETAMINOPHEN, AND CAFFEINE PRN EACH: 50; 325; 40 TABLET ORAL at 01:02

## 2020-09-25 RX ADMIN — RIVAROXABAN SCH MG: 15 TABLET, FILM COATED ORAL at 09:53

## 2020-09-25 RX ADMIN — BACLOFEN SCH MG: 10 TABLET ORAL at 16:51

## 2020-09-25 RX ADMIN — CARBIDOPA AND LEVODOPA SCH EACH: 25; 100 TABLET ORAL at 16:51

## 2020-09-25 RX ADMIN — BUTALBITAL, ACETAMINOPHEN, AND CAFFEINE PRN EACH: 50; 325; 40 TABLET ORAL at 06:05

## 2020-09-25 RX ADMIN — FLECAINIDE ACETATE SCH MG: 50 TABLET ORAL at 11:29

## 2020-09-25 RX ADMIN — METRONIDAZOLE SCH MLS/HR: 500 INJECTION, SOLUTION INTRAVENOUS at 06:06

## 2020-09-25 RX ADMIN — BUTALBITAL, ACETAMINOPHEN, AND CAFFEINE PRN EACH: 50; 325; 40 TABLET ORAL at 11:30

## 2020-09-25 RX ADMIN — CARBIDOPA AND LEVODOPA SCH EACH: 25; 100 TABLET ORAL at 06:07

## 2020-09-25 RX ADMIN — CARBIDOPA AND LEVODOPA SCH EACH: 25; 100 TABLET ORAL at 11:31

## 2020-09-25 RX ADMIN — BACLOFEN SCH MG: 10 TABLET ORAL at 09:52

## 2020-09-25 RX ADMIN — METRONIDAZOLE SCH: 500 INJECTION, SOLUTION INTRAVENOUS at 15:28

## 2020-09-25 RX ADMIN — CEFAZOLIN SCH MLS/HR: 330 INJECTION, POWDER, FOR SOLUTION INTRAMUSCULAR; INTRAVENOUS at 11:31

## 2020-09-25 RX ADMIN — METOPROLOL TARTRATE SCH: 50 TABLET, FILM COATED ORAL at 16:51

## 2020-09-25 RX ADMIN — SERTRALINE HYDROCHLORIDE SCH MG: 50 TABLET, FILM COATED ORAL at 09:52

## 2020-09-25 RX ADMIN — METOPROLOL TARTRATE SCH MG: 50 TABLET, FILM COATED ORAL at 09:52

## 2020-09-25 NOTE — P.PN
Subjective


Progress Note Date: 09/25/20





This is an 79-year-old  female with documented history of Parkinson's 

disease, hypertension, mitral valve prolapse, paroxysmal atrial fibrillation, 

she follows with Dr. Villar in the office.  Patient had been on 

anticoagulantion in the past on Eliquis, she had an ALLERGIC reaction to this, 

which is why she's not currently on anticoagulation.  History was obtained from 

the medical record as the patient appears to be confused, apparently the 

daughter gave most of the history.  Patient presented to the hospital with 

symptoms of nausea, vomiting, and diarrhea for 24 hour duration.  She was also 

experiencing some abdominal cramping and low-grade fever.  Her EKG on 

presentation here showed atrial fibrillation with moderately rapid ventricular 

response.  CT of the abdomen and pelvis showed minimal free fluid in the 

abdomen.  Her temperature has been up to 101.1 here, this morning 99.3.  White 

blood cell count 11.4, hemoglobin 12.1, platelet count 188.  Sodium 136, 

potassium 4.1, BUN 30, creatinine 0.5.  Blood pressure 93/60 with a heart rate 

in the 192% on room air.  She was given a Cardizem bolus as well as one dose of 

IV Lopressor on admission here.  Her home medications include Tenormin 25 mg 

twice a day, flat and I 50 twice a day, Sinemet, failure status, aspirin, and 

Zoloft.








Patient seen and examined this morning, much more alert and oriented today.  She

states she is no longer having loose stools.  Blood pressure 137/80 with a heart

rate in the 90s to low 120s.  White blood cell count 7.1, hemoglobin 10.9, 

platelet count 128.  Sodium 137, potassium 3.6, BUN 17, creatinine 0.5.  TSH is 

normal.  An echocardiogram with Doppler study was performed which revealed an 

ejection fraction of 45-50%.  She continues to be in atrial fibrillation which 

is chronic.  I spoke with the patient at length this morning regarding 

anticoagulation.  She did have an ALLERGIC reaction to Eliquis but stress test 

trying xarelto.  Explaining her risk for stroke overall being high.  She will 

speak with her daughter in detail about this, and I will follow-up with her 

later on today.








09/24/2020


Patient was seen and examined this morning, she is refusing to have any 

procedures done, refusing colonoscopy.  Just wants to continue medical therapy. 

She feels very weak this morning but is still on clear liquids.  Blood pressure 

136/80 with a heart rate in the 100s to 1 teens, 94% on room air.  Sodium 138, 

potassium 3.1, BUN 12, creatinine 0.5.








09/25/2020


Patient was seen and examined this morning she complains of feeling a little 

weak today, although she did complain of mild weakness yesterday to.  Overall 

she thinks she's feeling a little bit stronger.  She was increased to a full 

diet.  She is currently on Xarelto.  Anticipating a possible transferred to 

[facility] today.





Objective





- Vital Signs


Vital signs: 


                                   Vital Signs











Temp  97.5 F L  09/24/20 20:54


 


Pulse  95   09/25/20 08:00


 


Resp  18   09/25/20 08:00


 


BP  153/100   09/25/20 08:00


 


Pulse Ox  94 L  09/25/20 08:00








                                 Intake & Output











 09/24/20 09/25/20 09/25/20





 18:59 06:59 18:59


 


Intake Total 370  


 


Output Total 400  


 


Balance -30  


 


Weight  57.6 kg 


 


Intake:   


 


  Oral 370  


 


Output:   


 


  Urine 400  


 


Other:   


 


  Voiding Method Bedside Commode Bedside Commode 


 


  # Voids 1 1 


 


  # Bowel Movements 2 1 














- Exam








PHYSICAL EXAMINATION: 





GENERAL: 79-year-old  female in no acute distress at the time of my 

examination





HEENT: Head is atraumatic, normocephalic.  Pupils equal, round.  Sclera 

anicteric. Conjunctiva are clear.  Mucous membranes of the mouth are moist.  

Neck is supple.  There is no elevated jugular venous pressure.  No carotid  

bruit is heard.





HEART EXAMINATION: Heart S1 and S2 irregularly irregular a systolic murmur is 

heard





CHEST EXAMINATION: Lungs are clear to auscultation and precussion. No chest wall

tenderness is noted on palpation or with deep breathing.





ABDOMEN:  Soft, nontender. Bowel sounds are heard. No organomegaly noted.


 


EXTREMITIES: 2+ peripheral pulses with no evidence of peripheral edema and no 

calf tenderness noted.





NEUROLOGIC patient is awake, alert and oriented 3.





- Labs


CBC & Chem 7: 


                                 09/25/20 07:29





                                 09/25/20 07:29


Labs: 


                  Abnormal Lab Results - Last 24 Hours (Table)











  09/25/20 09/25/20 Range/Units





  07:29 07:29 


 


Lymphocytes #   0.6 L  (1.0-4.8)  k/uL


 


Potassium  3.3 L   (3.5-5.1)  mmol/L


 


Chloride  108 H   ()  mmol/L


 


Glucose  124 H   (74-99)  mg/dL


 


Calcium  8.1 L   (8.4-10.2)  mg/dL








                      Microbiology - Last 24 Hours (Table)











 09/22/20 01:27 Blood Culture - Preliminary





 Blood    No Growth after 72 hours














Assessment and Plan


Plan: 


Assessment and plan


#1 symptoms of nausea vomiting and diarrhea, sepsis, possible colitis.  Rule out

Covid 19


#2 paroxysmal atrial fibrillation, on beta blocker and flecainide as an 

outpatient.  She is not currently on anticoagulation because of an ALLERGIC 

reaction to Eliquis in the past


#3 hypertension


#4 mitral valve prolapse


#5 GERD





Plan


From cardiology's perspective, we will discontinue the IV Lasix and change over 

to oral diuretics.  Patient may be able to be discharged home today from our 

perspective.  Follow-up appointment in the office post discharge.


DNP note has been reviewed, I agree with a documented findings and plan of care.

 Patient was seen and examined.

## 2020-09-25 NOTE — P.PN
Subjective


Progress Note Date: 09/25/20





CHIEF COMPLAINT: Rectal prolapse





HISTORY OF PRESENT ILLNESS: Patient seen with Dr. Trejo.  Patient lying in 

bed comfortably.  She does still report having some issues with the rectal 

prolapse and feeling pressure.  Patient's family did not want to proceed with 

colonoscopy at this time and wants to do an outpatient.  Colonoscopy was 

canceled.  Patient is afebrile.  Denies any nausea or vomiting.  Potassium 3.3 

Mg 1.8





PHYSICAL EXAM: 


VITAL SIGNS: Reviewed.


GENERAL: Well-developed in no acute distress. 


HEENT:  No sclera icterus. Extraocular movements grossly intact.  Moist buccal 

mucosa. Head is atraumatic, normocephalic. 


ABDOMEN:  Soft.  Nondistended. Nontender. 


NEUROLOGIC: Alert and oriented. Cranial nerves II through XII grossly intact.





ASSESSMENT: 


1.  Rectal prolapse


2.  Nausea, vomiting and diarrhea with possible colitis


3.  Paroxysmal atrial fibrillation with episode of rapid ventricular response 

followed by cardiology


4.  Hypokalemia and hypomagnesemia being replaced





PLAN: 


-Recommend colonoscopy outpatient when stable


-Patient follow-up with Dr. Trejo in 1 week after discharge





Physician Assistant note has been reviewed by physician. Signing provider agrees

with the documented findings, assessment, and plan of care. 





Objective





- Vital Signs


Vital signs: 


                                   Vital Signs











Temp  97.5 F L  09/24/20 20:54


 


Pulse  95   09/25/20 08:00


 


Resp  18   09/25/20 08:00


 


BP  153/100   09/25/20 08:00


 


Pulse Ox  94 L  09/25/20 08:00








                                 Intake & Output











 09/24/20 09/25/20 09/25/20





 18:59 06:59 18:59


 


Intake Total 370  


 


Output Total 400  


 


Balance -30  


 


Weight  57.6 kg 


 


Intake:   


 


  Oral 370  


 


Output:   


 


  Urine 400  


 


Other:   


 


  Voiding Method Bedside Commode Bedside Commode 


 


  # Voids 1 1 


 


  # Bowel Movements 2 1 














- Labs


CBC & Chem 7: 


                                 09/25/20 07:29





                                 09/25/20 07:29


Labs: 


                  Abnormal Lab Results - Last 24 Hours (Table)











  09/25/20 09/25/20 Range/Units





  07:29 07:29 


 


Lymphocytes #   0.6 L  (1.0-4.8)  k/uL


 


Potassium  3.3 L   (3.5-5.1)  mmol/L


 


Chloride  108 H   ()  mmol/L


 


Glucose  124 H   (74-99)  mg/dL


 


Calcium  8.1 L   (8.4-10.2)  mg/dL








                      Microbiology - Last 24 Hours (Table)











 09/22/20 01:27 Blood Culture - Preliminary





 Blood    No Growth after 72 hours

## 2020-09-25 NOTE — P.DS
Providers


Date of admission: 


09/22/20 03:17





Attending physician: 


Romel Ku





Consults: 





                                        





09/22/20 03:18


Consult Physician Urgent 


   Consulting Provider: [facility]


   Consult Reason/Comments: RVR


   Do you want consulting provider notified?: Yes, Notify in am





09/22/20 10:42


Consult Physician Routine 


   Consulting Provider: Philipp Trejo


   Consult Reason/Comments: Rectal prolapse


   Do you want consulting provider notified?: Yes











Primary care physician: 


Michael Painting MD





Hospital Course: 





iagnoses:


-Sepsis: Secondary to gastroenteritis and sigmoid colitis, improving with 

antibiotics.  She will need outpatient colonoscopy


-Atrial fibrillation: Continue with Xarelto.  Rate is controlled.


-Generalized weakness, physical therapy recommended ECF for subacute rehab


-Gastroesophageal reflux disease


-Hypertension


-History of mitral valve prolapse








Hospital course:


The patient is a pleasant 79-year-old female came in now with the complaints of 

diarrhea watery with diffuse abdominal cramping with fever.CT of the abdomen and

pelvis showing thickening of the wall of the sigmoid: Suggestive of nonspecific 

colitis.  Patient has been evaluated by surgical team, she was treated with 

antibiotics, however patient refused inpatient colonoscopy and it was refers and

as stated for outpatient upon patient preferences.  Patient's symptoms improved 

and her diarrhea , yesterday she had only 3 bowel movements, small amount, mixed

with loose and formed neda.  Her abdominal pain improved as well.she is 

tolerating diet well.  Patient thinks she can be discharged today.  And 

cardiology team 


Patient was cleared for discharge by surgery team WILL be discharged on oral 

antibiotic


Also patient has been followed closely by cardiologist  for A. fib, rate 

controlled and patient was started on Xarelto by cardiology team which is 

tolerated well by the patient and patient was cleared for discharge by 

cardiologist


Problems and management plan were discussed with the patient and he verbalized 

understanding and acceptance


Patient was found stable and can be discharged home however he needs follow-up 

as an outpatient. Patient was instructed to follow up with PCP Dr. Painting within 

one week and patient agrees.  Also patient was instructed to follow up with her 

cardiologist Dr. Benedict in 2 weeks and her surgeon Dr. Trejo in one week, 

patient wants to call and make her on appointment.  I discussed this re

commendation also to the son Mr. Bolton was at bedside, and he will help and make

an appointment.  And also I discussed with patient and the son the indications 

for colonoscopy given her sigmoid colitis and to rule out lesion including but 

not limited to cancer, they verbalized understanding and acceptance





Gen: patient is a AAOx3, no distress


CVS: S1-S2, RRR, no murmur


Lungs: B/L CTA, no wheezing


Abdomen: soft, no distention, no tenderness, positive bowel sounds


Extremity: no leg edema or induration





Time spent more than 35 minutes











 


Patient Condition at Discharge: Serious





Plan - Discharge Summary


Discharge Rx Participant: Yes


New Discharge Prescriptions: 


New


   Butalb/APAP/Caff -40Mg [Fioricet -40] 1 each PO TID PRN #6 tab


     PRN Reason: Migraine Headache


   Metoprolol Tartrate [Lopressor] 50 mg PO TID  tab


   Cefuroxime Axetil [Ceftin] 500 mg PO BID 10 Days #20 tab


   metroNIDAZOLE [Flagyl] 500 mg PO Q8HR 8 Days #24 tab


   Potassium Chloride ER [K-Dur 10] 10 meq PO DAILY 5 Days #5 tab.er.prt


   Rivaroxaban [Xarelto] 15 mg PO DAILY  tab


   Cholestyramine (with Sugar) [Questran Packet] 4 gm PO DAILY@1000 PRN 2 Days 

#4 packet


     PRN Reason: Diarrhea





Continue


   Aspirin 81 mg PO HS


   Sertraline [Zoloft] 50 mg PO DAILY


   Butalb/Acetaminophen/Caffeine [Fioricet -40] 1 - 2 tab PO TID PRN


     PRN Reason: Migraine Headache


   Baclofen [Lioresal] 10 mg PO Q6H


   Flecainide [Tambocor] 50 mg PO BID@1200,2330


   Carbidopa-Levodopa  mg [Sinemet  mg] 1 tab PO 5XD


   Methyl Salicylate/Menthol [Salonpas Patch] 1 patch TOPICAL BID





Discontinued


   atenoloL [Tenormin] 25 mg PO BID@1200,2330


Discharge Medication List





Aspirin 81 mg PO HS 12/08/14 [History]


Baclofen [Lioresal] 10 mg PO Q6H 12/08/14 [History]


Butalb/Acetaminophen/Caffeine [Fioricet -40] 1 - 2 tab PO TID PRN 12/08/14

[History]


Sertraline [Zoloft] 50 mg PO DAILY 12/08/14 [History]


Carbidopa-Levodopa  mg [Sinemet  mg] 1 tab PO 5XD 09/22/20 [History]


Flecainide [Tambocor] 50 mg PO BID@1200,2330 09/22/20 [History]


Methyl Salicylate/Menthol [Salonpas Patch] 1 patch TOPICAL BID 09/22/20 

[History]


Butalb/APAP/Caff -40Mg [Fioricet -40] 1 each PO TID PRN #6 tab 

09/24/20 [Rx]


Metoprolol Tartrate [Lopressor] 50 mg PO TID  tab 09/24/20 [Rx]


Cefuroxime Axetil [Ceftin] 500 mg PO BID 10 Days #20 tab 09/25/20 [Rx]


Cholestyramine (with Sugar) [Questran Packet] 4 gm PO DAILY@1000 PRN 2 Days #4 

packet 09/25/20 [Rx]


Potassium Chloride ER [K-Dur 10] 10 meq PO DAILY 5 Days #5 tab.er.prt 09/25/20 

[Rx]


Rivaroxaban [Xarelto] 15 mg PO DAILY  tab 09/25/20 [Rx]


metroNIDAZOLE [Flagyl] 500 mg PO Q8HR 8 Days #24 tab 09/25/20 [Rx]








Follow up Appointment(s)/Referral(s): 


Dong Villra MD [STAFF PHYSICIAN] - 2 Weeks


Michael Painting MD [Primary Care Provider] - 1-2 days


[facility], [NON-STAFF] - 1-2 Days


Philipp Trejo MD [STAFF PHYSICIAN] - 1 Week


Activity/Diet/Wound Care/Special Instructions: 


Heart healthy diet





Activity is limited till you see your doctor


Discharge Disposition: TRANSFER TO SNF/ECF

## 2021-06-01 ENCOUNTER — HOSPITAL ENCOUNTER (OUTPATIENT)
Dept: HOSPITAL 47 - RADUSWWP | Age: 81
Discharge: HOME | End: 2021-06-01
Attending: THORACIC SURGERY (CARDIOTHORACIC VASCULAR SURGERY)
Payer: MEDICARE

## 2021-06-01 DIAGNOSIS — L97.212: ICD-10-CM

## 2021-06-01 DIAGNOSIS — I87.333: Primary | ICD-10-CM

## 2021-06-01 DIAGNOSIS — L97.222: ICD-10-CM

## 2021-06-01 PROCEDURE — 93922 UPR/L XTREMITY ART 2 LEVELS: CPT

## 2021-06-01 PROCEDURE — 93970 EXTREMITY STUDY: CPT

## 2021-06-01 NOTE — US
LOWER EXTREMITY VENOUS INSUFFICIENCY

 

CLINICAL HISTORY: NHW. Nonhealing wound 

 

SIDE PERFORMED:  Bilateral   

 

1)  Color flow is present and patency is documented in the following vessels.  No DVT or SVT is noted
.       

     Common Femoral Vein    

   Deep Femoral Vein

   Femoral Vein

   Popliteal Vein

   Proximal Calf Veins

 

   Greater Saph Vein  

   Upper Small Saph Vein  

 

2)  There is venous reflux noted at the following venous levels:  None 

 

 

 

 

 

 

 

IMPRESSION: No ultrasound evidence for acute deep or superficial venous thrombosis bilaterally.

## 2022-01-12 ENCOUNTER — HOSPITAL ENCOUNTER (INPATIENT)
Dept: HOSPITAL 47 - EC | Age: 82
LOS: 5 days | Discharge: HOME | DRG: 177 | End: 2022-01-17
Attending: HOSPITALIST | Admitting: HOSPITALIST
Payer: MEDICARE

## 2022-01-12 DIAGNOSIS — Z87.19: ICD-10-CM

## 2022-01-12 DIAGNOSIS — Z98.42: ICD-10-CM

## 2022-01-12 DIAGNOSIS — I48.19: ICD-10-CM

## 2022-01-12 DIAGNOSIS — M41.9: ICD-10-CM

## 2022-01-12 DIAGNOSIS — G89.29: ICD-10-CM

## 2022-01-12 DIAGNOSIS — Z98.41: ICD-10-CM

## 2022-01-12 DIAGNOSIS — Z90.49: ICD-10-CM

## 2022-01-12 DIAGNOSIS — E86.0: ICD-10-CM

## 2022-01-12 DIAGNOSIS — J12.82: ICD-10-CM

## 2022-01-12 DIAGNOSIS — Z79.01: ICD-10-CM

## 2022-01-12 DIAGNOSIS — K64.9: ICD-10-CM

## 2022-01-12 DIAGNOSIS — I34.1: ICD-10-CM

## 2022-01-12 DIAGNOSIS — M41.84: ICD-10-CM

## 2022-01-12 DIAGNOSIS — I10: ICD-10-CM

## 2022-01-12 DIAGNOSIS — Z79.899: ICD-10-CM

## 2022-01-12 DIAGNOSIS — K21.9: ICD-10-CM

## 2022-01-12 DIAGNOSIS — Z79.82: ICD-10-CM

## 2022-01-12 DIAGNOSIS — U07.1: Primary | ICD-10-CM

## 2022-01-12 DIAGNOSIS — I48.92: ICD-10-CM

## 2022-01-12 DIAGNOSIS — J96.01: ICD-10-CM

## 2022-01-12 DIAGNOSIS — M41.82: ICD-10-CM

## 2022-01-12 DIAGNOSIS — Z83.3: ICD-10-CM

## 2022-01-12 DIAGNOSIS — Z87.891: ICD-10-CM

## 2022-01-12 DIAGNOSIS — F41.8: ICD-10-CM

## 2022-01-12 DIAGNOSIS — K62.3: ICD-10-CM

## 2022-01-12 DIAGNOSIS — M54.50: ICD-10-CM

## 2022-01-12 DIAGNOSIS — D69.6: ICD-10-CM

## 2022-01-12 PROCEDURE — 93005 ELECTROCARDIOGRAM TRACING: CPT

## 2022-01-12 PROCEDURE — 96374 THER/PROPH/DIAG INJ IV PUSH: CPT

## 2022-01-12 PROCEDURE — 83615 LACTATE (LD) (LDH) ENZYME: CPT

## 2022-01-12 PROCEDURE — 83605 ASSAY OF LACTIC ACID: CPT

## 2022-01-12 PROCEDURE — 84100 ASSAY OF PHOSPHORUS: CPT

## 2022-01-12 PROCEDURE — 85730 THROMBOPLASTIN TIME PARTIAL: CPT

## 2022-01-12 PROCEDURE — 99285 EMERGENCY DEPT VISIT HI MDM: CPT

## 2022-01-12 PROCEDURE — 80048 BASIC METABOLIC PNL TOTAL CA: CPT

## 2022-01-12 PROCEDURE — 84145 PROCALCITONIN (PCT): CPT

## 2022-01-12 PROCEDURE — 71045 X-RAY EXAM CHEST 1 VIEW: CPT

## 2022-01-12 PROCEDURE — 83735 ASSAY OF MAGNESIUM: CPT

## 2022-01-12 PROCEDURE — 80053 COMPREHEN METABOLIC PANEL: CPT

## 2022-01-12 PROCEDURE — 85610 PROTHROMBIN TIME: CPT

## 2022-01-12 PROCEDURE — 85025 COMPLETE CBC W/AUTO DIFF WBC: CPT

## 2022-01-12 PROCEDURE — 86140 C-REACTIVE PROTEIN: CPT

## 2022-01-12 PROCEDURE — 36415 COLL VENOUS BLD VENIPUNCTURE: CPT

## 2022-01-12 PROCEDURE — 94760 N-INVAS EAR/PLS OXIMETRY 1: CPT

## 2022-01-12 PROCEDURE — 87635 SARS-COV-2 COVID-19 AMP PRB: CPT

## 2022-01-12 PROCEDURE — 96361 HYDRATE IV INFUSION ADD-ON: CPT

## 2022-01-12 PROCEDURE — 85379 FIBRIN DEGRADATION QUANT: CPT

## 2022-01-12 NOTE — ED
Weakness HPI





- General


Chief complaint: Upper Respiratory Infection


Stated complaint: Covid Symptoms


Time Seen by Provider: 01/12/22 23:57


Source: patient, EMS, RN notes reviewed, old records reviewed


Mode of arrival: EMS


Limitations: no limitations





- History of Present Illness


Initial comments: 





This is an 81-year-old female to the emergency room today.  Patient presents 

today for evaluation of weakness sore throat not feeling well.  Cough and 

shortness of breath.  Possible fever.  Patient has no known significant sick 

contacts she does suffer from A. fib high blood pressure.  She is significantly 

debilitated.  Patient herself is without real significant complaint no complaint

of shortness of breath currently.  Patient history is obtained from family who 

is also at bedside.


MD Complaint: generalized weakness, lack of energy, difficulty walking


-: days(s)


Location: generalized


Severity: moderate


Severity scale (1-10): 5


Quality: numbness


Consistency: constant


Improves with: none


Worsens with: none


Context: recent illness


Associated Symptoms: confusion, loss of appetite, nausea/vomiting, shortness of 

breath





- Related Data


                                Home Medications











 Medication  Instructions  Recorded  Confirmed


 


Aspirin 81 mg PO HS 12/08/14 09/22/20


 


Baclofen [Lioresal] 10 mg PO Q6H 12/08/14 09/22/20


 


Butalb/Acetaminophen/Caffeine 1 - 2 tab PO TID PRN 12/08/14 09/22/20





[Fioricet -40]   


 


Sertraline [Zoloft] 50 mg PO DAILY 12/08/14 09/22/20


 


Carbidopa-Levodopa  mg 1 tab PO 5XD 09/22/20 09/22/20





[Sinemet  mg]   


 


Flecainide [Tambocor] 50 mg PO BID@1200,2330 09/22/20 09/22/20


 


Methyl Salicylate/Menthol 1 patch TOPICAL BID 09/22/20 09/22/20





[Salonpas Patch]   








                                  Previous Rx's











 Medication  Instructions  Recorded


 


Butalb/APAP/Caff -40Mg 1 each PO TID PRN #6 tab 09/24/20





[Fioricet -40]  


 


Metoprolol Tartrate [Lopressor] 50 mg PO TID  tab 09/24/20


 


Cefuroxime Axetil [Ceftin] 500 mg PO BID 10 Days #20 tab 09/25/20


 


Cholestyramine (with Sugar) 4 gm PO DAILY@1000 PRN 2 Days #4 09/25/20





[Questran Packet] packet 


 


Potassium Chloride ER [K-Dur 10] 10 meq PO DAILY 5 Days #5 09/25/20





 tab.er.prt 


 


Rivaroxaban [Xarelto] 15 mg PO DAILY  tab 09/25/20


 


metroNIDAZOLE [Flagyl] 500 mg PO Q8HR 8 Days #24 tab 09/25/20











                                    Allergies











Allergy/AdvReac Type Severity Reaction Status Date / Time


 


No Known Allergies Allergy   Verified 09/22/20 08:38














Review of Systems


ROS Statement: 


Those systems with pertinent positive or pertinent negative responses have been 

documented in the HPI.





ROS Other: All systems not noted in ROS Statement are negative.





Past Medical History


Past Medical History: Atrial Fibrillation, Eye Disorder, GERD/Reflux, 

Hypertension, Mitral Valve Prolapse (MVP)


Additional Past Medical History / Comment(s): HYPOGLYCEMIC


History of Any Multi-Drug Resistant Organisms: None Reported


Past Surgical History: Back Surgery, Cholecystectomy


Additional Past Surgical History / Comment(s): RT CATARACT REMOVED.  

COLONOSCOPY.  HEMORRHOIDECTOMY


Past Anesthesia/Blood Transfusion Reactions: No Reported Reaction


Past Psychological History: Anxiety, Depression


Smoking Status: Former smoker


Past Alcohol Use History: None Reported


Past Drug Use History: None Reported





General Exam


Limitations: no limitations


General appearance: alert, in no apparent distress


Head exam: Present: atraumatic, normocephalic, normal inspection


Eye exam: Present: normal appearance, PERRL, EOMI.  Absent: scleral icterus, c

onjunctival injection, periorbital swelling


ENT exam: Present: normal exam, mucous membranes dry


Neck exam: Present: normal inspection.  Absent: tenderness, meningismus, 

lymphadenopathy


Respiratory exam: Present: normal lung sounds bilaterally.  Absent: respiratory 

distress, wheezes, rales, rhonchi, stridor


Cardiovascular Exam: Present: normal rhythm, tachycardia, normal heart sounds.  

Absent: systolic murmur, diastolic murmur, rubs, gallop, clicks


GI/Abdominal exam: Present: soft, normal bowel sounds.  Absent: distended, 

tenderness, guarding, rebound, rigid


Extremities exam: Present: normal inspection, full ROM, normal capillary refill.

  Absent: tenderness, pedal edema, joint swelling, calf tenderness


Back exam: Present: normal inspection


Neurological exam: Present: alert, oriented X3, CN II-XII intact


Psychiatric exam: Present: normal affect, normal mood


Skin exam: Present: warm, dry, intact, normal color.  Absent: rash





Course


                                   Vital Signs











  01/12/22





  23:57


 


Temperature 99.3 F


 


Pulse Rate 103 H


 


Respiratory 18





Rate 


 


Blood Pressure 94/64


 


O2 Sat by Pulse 90 L





Oximetry 














- Reevaluation(s)


Reevaluation #1: 





01/13/22 02:49


Medical record is reviewed


Reevaluation #2: 





01/13/22 02:49


Patient family informed of results and questions answered


Reevaluation #3: 





01/13/22 02:49


Patient is improved here in the ER with supplemental O2 and fever control





- Consultations


Consultation #1: 





Spoke with Dr. Cotton agrees to admit this patient





EKG Findings





- EKG Comments:


EKG Findings:: EKG is A. fib 95 QRS 86 QTc 475





Medical Decision Making





- Medical Decision Making





81 female DF for evaluation of severe weakness shortness of breath with positive

 for coronavirus.  Coronavirus pneumonia on x-ray.  Patient be admitted for 

supportive care





- Lab Data


Result diagrams: 


                                 01/13/22 00:36





                                 01/13/22 00:36


                                   Lab Results











  01/13/22 01/13/22 01/13/22 Range/Units





  00:36 00:36 00:36 


 


WBC  5.3    (3.8-10.6)  k/uL


 


RBC  3.84    (3.80-5.40)  m/uL


 


Hgb  11.5    (11.4-16.0)  gm/dL


 


Hct  34.2    (34.0-46.0)  %


 


MCV  89.0    (80.0-100.0)  fL


 


MCH  30.0    (25.0-35.0)  pg


 


MCHC  33.7    (31.0-37.0)  g/dL


 


RDW  13.6    (11.5-15.5)  %


 


Plt Count  113 L    (150-450)  k/uL


 


MPV  9.0    


 


Neutrophils %  85    %


 


Lymphocytes %  6    %


 


Monocytes %  6    %


 


Eosinophils %  1    %


 


Basophils %  0    %


 


Neutrophils #  4.5    (1.3-7.7)  k/uL


 


Lymphocytes #  0.3 L    (1.0-4.8)  k/uL


 


Monocytes #  0.3    (0-1.0)  k/uL


 


Eosinophils #  0.1    (0-0.7)  k/uL


 


Basophils #  0.0    (0-0.2)  k/uL


 


PT   11.7   (9.0-12.0)  sec


 


INR   1.1   (<1.2)  


 


APTT   27.3   (22.0-30.0)  sec


 


Sodium    135 L  (137-145)  mmol/L


 


Potassium    3.2 L  (3.5-5.1)  mmol/L


 


Chloride    102  ()  mmol/L


 


Carbon Dioxide    25  (22-30)  mmol/L


 


Anion Gap    8  mmol/L


 


BUN    23 H  (7-17)  mg/dL


 


Creatinine    0.61  (0.52-1.04)  mg/dL


 


Est GFR (CKD-EPI)AfAm    >90  (>60 ml/min/1.73 sqM)  


 


Est GFR (CKD-EPI)NonAf    85  (>60 ml/min/1.73 sqM)  


 


Glucose    125 H  (74-99)  mg/dL


 


Plasma Lactic Acid Atilio     (0.7-2.0)  mmol/L


 


Calcium    8.1 L  (8.4-10.2)  mg/dL


 


Magnesium    1.8  (1.6-2.3)  mg/dL


 


Total Bilirubin    0.6  (0.2-1.3)  mg/dL


 


AST    71 H  (14-36)  U/L


 


ALT    8  (4-34)  U/L


 


Alkaline Phosphatase    137 H  ()  U/L


 


Lactate Dehydrogenase    412  (313-618)  U/L


 


C-Reactive Protein    7.9 H  (<1.0)  mg/dL


 


Total Protein    6.1 L  (6.3-8.2)  g/dL


 


Albumin    3.4 L  (3.5-5.0)  g/dL


 


Coronavirus (PCR)     (Not Detectd)  














  01/13/22 01/13/22 Range/Units





  00:36 00:36 


 


WBC    (3.8-10.6)  k/uL


 


RBC    (3.80-5.40)  m/uL


 


Hgb    (11.4-16.0)  gm/dL


 


Hct    (34.0-46.0)  %


 


MCV    (80.0-100.0)  fL


 


MCH    (25.0-35.0)  pg


 


MCHC    (31.0-37.0)  g/dL


 


RDW    (11.5-15.5)  %


 


Plt Count    (150-450)  k/uL


 


MPV    


 


Neutrophils %    %


 


Lymphocytes %    %


 


Monocytes %    %


 


Eosinophils %    %


 


Basophils %    %


 


Neutrophils #    (1.3-7.7)  k/uL


 


Lymphocytes #    (1.0-4.8)  k/uL


 


Monocytes #    (0-1.0)  k/uL


 


Eosinophils #    (0-0.7)  k/uL


 


Basophils #    (0-0.2)  k/uL


 


PT    (9.0-12.0)  sec


 


INR    (<1.2)  


 


APTT    (22.0-30.0)  sec


 


Sodium    (137-145)  mmol/L


 


Potassium    (3.5-5.1)  mmol/L


 


Chloride    ()  mmol/L


 


Carbon Dioxide    (22-30)  mmol/L


 


Anion Gap    mmol/L


 


BUN    (7-17)  mg/dL


 


Creatinine    (0.52-1.04)  mg/dL


 


Est GFR (CKD-EPI)AfAm    (>60 ml/min/1.73 sqM)  


 


Est GFR (CKD-EPI)NonAf    (>60 ml/min/1.73 sqM)  


 


Glucose    (74-99)  mg/dL


 


Plasma Lactic Acid Atilio  0.8   (0.7-2.0)  mmol/L


 


Calcium    (8.4-10.2)  mg/dL


 


Magnesium    (1.6-2.3)  mg/dL


 


Total Bilirubin    (0.2-1.3)  mg/dL


 


AST    (14-36)  U/L


 


ALT    (4-34)  U/L


 


Alkaline Phosphatase    ()  U/L


 


Lactate Dehydrogenase    (313-618)  U/L


 


C-Reactive Protein    (<1.0)  mg/dL


 


Total Protein    (6.3-8.2)  g/dL


 


Albumin    (3.5-5.0)  g/dL


 


Coronavirus (PCR)   Detected A  (Not Detectd)  














- Radiology Data


Radiology results: report reviewed (Chest x-rays positive for coronavirus), 

image reviewed





Disposition


Clinical Impression: 


 Dehydration, Weakness, Fever, Coronavirus infection, Pneumonia due to COVID-19 

virus, Hypoxia





Disposition: ADMITTED AS IP TO THIS HOSP


Condition: Fair


Is patient prescribed a controlled substance at d/c from ED?: No

## 2022-01-13 LAB
ALBUMIN SERPL-MCNC: 3.4 G/DL (ref 3.5–5)
ALP SERPL-CCNC: 137 U/L (ref 38–126)
ALT SERPL-CCNC: 8 U/L (ref 4–34)
ANION GAP SERPL CALC-SCNC: 8 MMOL/L
APTT BLD: 27.3 SEC (ref 22–30)
AST SERPL-CCNC: 71 U/L (ref 14–36)
BASOPHILS # BLD AUTO: 0 K/UL (ref 0–0.2)
BASOPHILS NFR BLD AUTO: 0 %
BUN SERPL-SCNC: 23 MG/DL (ref 7–17)
CALCIUM SPEC-MCNC: 8.1 MG/DL (ref 8.4–10.2)
CHLORIDE SERPL-SCNC: 102 MMOL/L (ref 98–107)
CO2 SERPL-SCNC: 25 MMOL/L (ref 22–30)
EOSINOPHIL # BLD AUTO: 0.1 K/UL (ref 0–0.7)
EOSINOPHIL NFR BLD AUTO: 1 %
ERYTHROCYTE [DISTWIDTH] IN BLOOD BY AUTOMATED COUNT: 3.84 M/UL (ref 3.8–5.4)
ERYTHROCYTE [DISTWIDTH] IN BLOOD: 13.6 % (ref 11.5–15.5)
GLUCOSE SERPL-MCNC: 125 MG/DL (ref 74–99)
HCT VFR BLD AUTO: 34.2 % (ref 34–46)
HGB BLD-MCNC: 11.5 GM/DL (ref 11.4–16)
INR PPP: 1.1 (ref ?–1.2)
LDH SPEC-CCNC: 412 U/L (ref 313–618)
LYMPHOCYTES # SPEC AUTO: 0.3 K/UL (ref 1–4.8)
LYMPHOCYTES NFR SPEC AUTO: 6 %
MAGNESIUM SPEC-SCNC: 1.8 MG/DL (ref 1.6–2.3)
MCH RBC QN AUTO: 30 PG (ref 25–35)
MCHC RBC AUTO-ENTMCNC: 33.7 G/DL (ref 31–37)
MCV RBC AUTO: 89 FL (ref 80–100)
MONOCYTES # BLD AUTO: 0.3 K/UL (ref 0–1)
MONOCYTES NFR BLD AUTO: 6 %
NEUTROPHILS # BLD AUTO: 4.5 K/UL (ref 1.3–7.7)
NEUTROPHILS NFR BLD AUTO: 85 %
PLATELET # BLD AUTO: 113 K/UL (ref 150–450)
POTASSIUM SERPL-SCNC: 3.2 MMOL/L (ref 3.5–5.1)
PROT SERPL-MCNC: 6.1 G/DL (ref 6.3–8.2)
PT BLD: 11.7 SEC (ref 9–12)
SODIUM SERPL-SCNC: 135 MMOL/L (ref 137–145)
WBC # BLD AUTO: 5.3 K/UL (ref 3.8–10.6)

## 2022-01-13 RX ADMIN — Medication SCH MG: at 21:50

## 2022-01-13 RX ADMIN — ASPIRIN 81 MG CHEWABLE TABLET SCH MG: 81 TABLET CHEWABLE at 21:47

## 2022-01-13 RX ADMIN — CARBIDOPA AND LEVODOPA SCH EACH: 25; 100 TABLET ORAL at 20:02

## 2022-01-13 RX ADMIN — BACLOFEN SCH MG: 10 TABLET ORAL at 17:15

## 2022-01-13 RX ADMIN — POTASSIUM CHLORIDE SCH MEQ: 750 TABLET, EXTENDED RELEASE ORAL at 12:25

## 2022-01-13 RX ADMIN — CEFAZOLIN SCH MLS/HR: 330 INJECTION, POWDER, FOR SOLUTION INTRAMUSCULAR; INTRAVENOUS at 04:02

## 2022-01-13 RX ADMIN — CHOLECALCIFEROL TAB 125 MCG (5000 UNIT) SCH MCG: 125 TAB at 21:50

## 2022-01-13 RX ADMIN — BUTALBITAL, ACETAMINOPHEN, AND CAFFEINE PRN EACH: 50; 325; 40 TABLET ORAL at 15:31

## 2022-01-13 RX ADMIN — CARBIDOPA AND LEVODOPA SCH EACH: 25; 100 TABLET ORAL at 23:01

## 2022-01-13 RX ADMIN — CARBIDOPA AND LEVODOPA SCH EACH: 25; 100 TABLET ORAL at 12:26

## 2022-01-13 RX ADMIN — OXYCODONE HYDROCHLORIDE AND ACETAMINOPHEN SCH MG: 500 TABLET ORAL at 21:46

## 2022-01-13 RX ADMIN — RIVAROXABAN SCH MG: 15 TABLET, FILM COATED ORAL at 12:25

## 2022-01-13 RX ADMIN — FUROSEMIDE SCH MG: 20 TABLET ORAL at 12:25

## 2022-01-13 RX ADMIN — BACLOFEN SCH MG: 10 TABLET ORAL at 12:28

## 2022-01-13 RX ADMIN — CARBIDOPA AND LEVODOPA SCH EACH: 25; 100 TABLET ORAL at 17:15

## 2022-01-13 RX ADMIN — CEFAZOLIN SCH MLS/HR: 330 INJECTION, POWDER, FOR SOLUTION INTRAMUSCULAR; INTRAVENOUS at 20:01

## 2022-01-13 RX ADMIN — FAMOTIDINE SCH MG: 20 TABLET, FILM COATED ORAL at 21:47

## 2022-01-13 RX ADMIN — BACLOFEN SCH MG: 10 TABLET ORAL at 23:00

## 2022-01-13 RX ADMIN — SERTRALINE HYDROCHLORIDE SCH MG: 50 TABLET, FILM COATED ORAL at 12:25

## 2022-01-13 RX ADMIN — METOPROLOL TARTRATE SCH MG: 50 TABLET, FILM COATED ORAL at 17:15

## 2022-01-13 RX ADMIN — METOPROLOL TARTRATE SCH MG: 50 TABLET, FILM COATED ORAL at 12:28

## 2022-01-13 NOTE — XR
EXAMINATION TYPE: XR chest 1V portable

 

DATE OF EXAM: 1/13/2022

 

COMPARISON: 9/22/2020

 

HISTORY: Pneumonia

 

TECHNIQUE:

 

FINDINGS: Heart is enlarged. There is mild increased density in the left lower lobe behind the heart.
 There is no heart failure. There is multilevel thoracolumbar posterior fusion surgery. Thoracic aort
a is atheromatous. There is slight blunting left costophrenic angle. The right lung is clear.

 

IMPRESSION: There is mild infiltrate and pleural reaction left lung base which is increased compared 
to last exam. No heart failure seen.

## 2022-01-13 NOTE — P.CNPUL
History of Present Illness


Consult date: 01/13/22


Reason for consult: dyspnea


History of present illness: 





81-year-old female patient came into the ED because of symptoms of URI and cold 

with related symptoms.  The patient also had weakness, sore throat, not feeling 

good, body aches, cough and shortness of breath.  She is not known to have any 

sick contacts.  In the ED, the patient was found to have a temperature of 99.3, 

pulse is was low as 90%.  Her chest x-ray showed cardiomegaly.  There was a 

summation patient's including rods stabilizing the lumbar spine.  There is some 

increase in dyspnea markings bilaterally.  No consolidation or airspace disease.

 White cell count is at 5.3 with a hemoglobin of 11.5 and a platelet count of 

113.  Coagulation profile was within normal limits.  Creatinine is at 23 with a 

creatinine of 0.6.  LFTs showed an AST of 71, ALT of 8, alkaline phosphatase 

137, normal electrolytes, creatinine of 0.6 and a CRP level was at 6.1 and a 

Coban 19th testing came back positive by PCR.  The patient is currently on 

oxygen at 2 L per minute nasal cannula.  She is  vaccinated for Covid 19 x2 

without booster .  She had limited on long-term and to coagulation with Xarelto 

15 mg by mouth daily regarding history of chronic atrial fibrillation.  Her 

current EKG is showing a A. fib rhythm, rate is controlled for now.   





Review of Systems


Constitutional: Reports fatigue, Reports lethargy, Reports poor appetite, 

Reports weakness


Eyes: denies as per HPI, denies blurred vision, denies bulging eye, denies 

decreased vision, denies diplopia, denies discharge, denies dry eye, denies 

irritation, denies itching, denies pain, denies photophobia, denies loss of 

peripheral vision, denies loss of vision, denies tunnel vision/blind spots


Ears: deny: decreased hearing, ear discharge, earache, tinnitus


Ears, nose, mouth and throat: Reports as per HPI


Breasts: absent: as per HPI, change in shape, gynecomastia, masses, nipple disc

harge, pain, skin changes, swelling


Cardiovascular: Reports decreased exercise tolerance, Reports dyspnea on 

exertion


Respiratory: Reports cough, Reports dyspnea


Gastrointestinal: Reports as per HPI


Genitourinary: Reports as per HPI


Menstruation: Reports as per HPI


Musculoskeletal: Reports as per HPI


Musculoskeletal: absent: ankle pain, ankle stiffness, ankle swelling, as per 

HPI, elbow pain, elbow stiffness, elbow swelling, foot pain, foot stiffness, 

foot swelling, hand pain, hand stiffness, hand swelling, hip pain, hip 

stiffness, hip swelling, knee pain, knee stiffness, knee swelling, shoulder 

pain, shoulder stiffness, shoulder swelling, wrist pain, wrist stiffness, wrist 

swelling


Integumentary: Reports as per HPI


Neurological: Reports as per HPI, Reports weakness


Endocrine: Reports as per HPI, Reports fatigue


Hematologic/Lymphatic: Reports as per HPI


Allergic/Immunologic: Reports as per HPI





Past Medical History


Past Medical History: Atrial Fibrillation, Eye Disorder, GERD/Reflux, 

Hypertension, Mitral Valve Prolapse (MVP)


History of Any Multi-Drug Resistant Organisms: None Reported


Past Surgical History: Back Surgery, Cholecystectomy


Additional Past Surgical History / Comment(s): RT CATARACT REMOVED.  

COLONOSCOPY.  HEMORRHOIDECTOMY


Past Anesthesia/Blood Transfusion Reactions: No Reported Reaction


Past Psychological History: Anxiety, Depression


Smoking Status: Former smoker


Past Alcohol Use History: None Reported


Past Drug Use History: None Reported





Medications and Allergies


                                Home Medications











 Medication  Instructions  Recorded  Confirmed  Type


 


Aspirin 81 mg PO HS 12/08/14 01/13/22 History


 


Baclofen [Lioresal] 10 mg PO Q6H 12/08/14 01/13/22 History


 


Butalb/Acetaminophen/Caffeine 1 - 2 tab PO QID PRN MDD 6 tabs 12/08/14 01/13/22 

History





[Fioricet -40]    


 


Sertraline [Zoloft] 50 mg PO DAILY 12/08/14 01/13/22 History


 


Carbidopa-Levodopa  mg 1 tab PO AS DIRECTED 09/22/20 01/13/22 History





[Sinemet  mg]    


 


Potassium Chloride ER [K-Dur 10] 10 meq PO DAILY 5 Days #5 09/25/20 01/13/22 Rx





 tab.er.prt   


 


Rivaroxaban [Xarelto] 15 mg PO DAILY  tab 09/25/20 01/13/22 Rx


 


Furosemide [Lasix] 20 mg PO DAILY 01/13/22 01/13/22 History


 


Metoprolol Tartrate [Lopressor] 50 mg PO BID-W/MEALS 01/13/22 01/13/22 History








                                    Allergies











Allergy/AdvReac Type Severity Reaction Status Date / Time


 


No Known Allergies Allergy   Verified 09/22/20 08:38














Physical Exam


Vitals: 


                                   Vital Signs











  Temp Pulse Pulse Resp BP BP Pulse Ox


 


 01/13/22 07:23   73   18  92/48   97


 


 01/13/22 07:00  97.5 F L   69  12   96/64  98


 


 01/13/22 05:39   77   18  95/72   93 L


 


 01/13/22 04:09   85   18  99/64   98


 


 01/13/22 02:57   72   18  94/59   98


 


 01/13/22 01:57   79   18  91/50   98


 


 01/12/22 23:57  99.3 F  103 H   18  94/64   90 L








                                Intake and Output











 01/12/22 01/13/22 01/13/22





 22:59 06:59 14:59


 


Other:   


 


  Voiding Method   Toilet


 


  # Voids  1 


 


  # Bowel Movements  1 


 


  Weight  51.71 kg 

















General appearance: alert, in no apparent distress the patient is currently on 

room air oxygen.


Head exam: Present: atraumatic, normocephalic, normal inspection


Eye exam: Present: normal appearance, PERRL, EOMI.  Absent: scleral icterus, 

conjunctival injection, periorbital swelling


ENT exam: Present: normal exam, mucous membranes dry


Neck exam: Present: normal inspection.  Absent: tenderness, meningismus, 

lymphadenopathy


Respiratory exam: Present: normal lung sounds bilaterally.  Absent: respiratory 

distress, wheezes, rales, rhonchi, stridor, and the patient has significant 

kyphoscoliosis of the cervical and thoracic spine.  Her neck is quite flexed at 

this point in time.


Cardiovascular Exam: Present: normal rhythm, tachycardia, normal heart sounds.  

Absent: systolic murmur, diastolic murmur, rubs, gallop, clicks


GI/Abdominal exam: Present: soft, normal bowel sounds.  Absent: distended, 

tenderness, guarding, rebound, rigid


Extremities exam: Present: normal inspection, full ROM, normal capillary refill.

 Absent: tenderness, pedal edema, joint swelling, calf tenderness


Back exam: Present: normal inspection


Neurological exam: Present: alert, oriented X3, CN II-XII intact


Psychiatric exam: Present: normal affect, normal mood


Skin exam: Present: warm, dry, intact, normal color.  Absent: rash





Results





- Laboratory Findings


CBC and BMP: 


                                 01/13/22 00:36





                                 01/13/22 00:36


PT/INR, D-dimer











PT  11.7 sec (9.0-12.0)   01/13/22  00:36    


 


INR  1.1  (<1.2)   01/13/22  00:36    








Abnormal lab findings: 


                                  Abnormal Labs











  01/13/22 01/13/22 01/13/22





  00:36 00:36 00:36


 


Plt Count  113 L  


 


Lymphocytes #  0.3 L  


 


Sodium   135 L 


 


Potassium   3.2 L 


 


BUN   23 H 


 


Glucose   125 H 


 


Calcium   8.1 L 


 


AST   71 H 


 


Alkaline Phosphatase   137 H 


 


C-Reactive Protein   7.9 H 


 


Total Protein   6.1 L 


 


Albumin   3.4 L 


 


Coronavirus (PCR)    Detected A














- Diagnostic Findings


Chest x-ray: image reviewed





Assessment and Plan


Plan: 








1 acute COVID 19 infection in 7-year-old female patient who has widely received 

a vaccination for COVID 19 and the patient has received the message RNA Pfizer 

vaccine without a booster.  The patient is presenting with constitutional 

symptoms along with some cough and shortness of breath related to the infection.

 Chest x-ray does not show clear signs of an infection/pneumonia.  The patient's

Room Air Oxygen.  Her Pulse Ox Is Maintained above 90%.  The Exact   timing for 

her symptom onset cannot be clearly established.  The patient has been sick 

probably for less than a week.





2 chronic atrial fibrillation method on long-term anticoagulation with Xarelto





3 cervical and thoracic kyphoscoliosis





4 thrombocytopenia, a chronic problem for this patient





Plan





Monitor the oxygenation and the patient is currently on room in oxygen.  She has

slightly hypoxic and her pulse ox in the low 90s.


Agree for a total of 10 day course of Decadron 6 mg by mouth daily


Supplemented with multivitamins including vitamin C and the zinc


Continue anticoagulation with Xarelto


Check LDH and pro-calcitonin and the dimers


This is a simple case of COVID 19 infection.    We'll leave the management of 

the medicine.  The patient is currently on room air oxygen.  She carries a good 

prognosis based on the fact that she is vaccinated.  Contact us back if there is

any worsening in her condition.





 





5

## 2022-01-13 NOTE — P.HPIM
History of Present Illness


H&P Date: 01/13/22


Chief Complaint: Feeling unwell





This is a 21-year-old patient, Dr. Michael Painting.  Chronic stable medical 

conditions include atrial fibrillation, GERD, hypertension, mitral valve 

prolapse, chronic low back pain with any dizziness, scoliosis, right hand 

tremors, anxiety depression.  Does use a walker to ambulate.


Per the EMS/ER report patient was not able to ambulate without assistance which 

is not normal.  Decrease eating and drinking at least the last 23 days.  Patient

also has a sore throat low-grade fever for at least 2 days.  Cough.  No sputum. 

Pulse ox initially noted was 89%.  Tired rundown.





Review of systems:


GEN.:  Fever tired decreased appetite


EYES: None


HEENT: None


NECK: None


RESPIRATORY: As above


CARDIOVASCULAR: None


GASTROINTESTINAL: None


GENITOURINARY: None


MUSCULOSKELETAL: Chronic low back pain


LYMPHATICS: None


HEMATOLOGICAL: None  


PSYCHIATRY: None


NEUROLOGICAL: Does use a walker, chronic right hand tremor





Past medical history to include:


Atrial fibrillation, GERD, hypertension, mitral valve prolapse, colitis, lower 

back herniated disc, scoliosis, right hand tremor, pain clinic procedures, 

anxiety depression





Social history:


Patient lives with her daughter.  Does use a walker.  Patient smoked for 20 

years stopped in 1986.  No alcohol.





Family history:


Diabetes, alcohol





Physical examination:


VITAL SIGNS: 99.3, 103, 18, 94/64, 90% on room air 89% reported at home by EMS


GENERAL: BMI 19.6, laying in bed, awake, tired neck tilted to left.


EYES: Pupils equal.  Conjunctiva normal.


HEENT: External appearance of nose and ears normal, oral cavity dry mucous 

membranes.


NECK: JVD not raised; masses not palpable.


HEART: First and second heart sounds are normal;  no edema.  


LUNGS: Respiratory rate increased; decreased breath sounds, occasional crackle. 




ABDOMEN: Soft,  nontender, liver spleen not palpable, no masses palpable.  


PSYCH: Alert and oriented x3;  mood  and affect normal.  


MUSCULOSKELETAL:No Clubbing/cyanosis;muscles-grossly intact.  Neck tilted to 

left


NEUROLOGICAL: Cranial nerves grossly intact; no facial asymmetry,   power and 

sensation grossly intact. 


LYMPHATICS: No lymph nodes palpable in the axilla and neck





INVESTIGATIONS, reviewed in the clinical context:


White count 5.3 hemoglobin 11.5 platelets 113 sodium 135 potassium 3.2.  23 

creatinine 0.61 albumin 3.4


Coronavirus [PCR]: Not detected


EKG tracing personally reviewed by me-atrial flutter/fibrillation.  Nonspecific 

ST segment changes.  Rate 95


Chest x-ray film personally reviewed by me-possible infiltrate.  Some 

cardiomegaly.





Assessment and plan:





-Acute COVID 19 pneumonitis.


Vitamin C, vitamin D, zinc.  On xarelto





-Acute hypoxic respiratory failure will pulse ox 89% at home and 90% on room air

 upon presentation.


Dexamethasone 6 mg daily.  Supplement oxygen to keep pulse ox above 92%





-Persistent atrial flutter fibrillation, controlled


Continue xarelto.  Lopressor 50 mg 3 times a day





-GERD


Pepcid 20 mg twice a day





-Chronic gait dysfunction, uses a walker


Activity as tolerated





-Anxiety/depression


Zoloft 50 mg a day





-Chronic muscle spasms


Baclofen 10 mg every 6





Continue xarelto.  Dexamethasone.  2 L nasal cannula.  Home medications resumed.

  Care was discussed with the patient.  Questions answered.





Past Medical History


Past Medical History: Atrial Fibrillation, Eye Disorder, GERD/Reflux, 

Hypertension, Mitral Valve Prolapse (MVP)


Additional Past Medical History / Comment(s): Mitral valve prolapse, 

hypoglycemia, colitis/gastroenteritis with sepsis, migrained, back pain, 

herniated disc, scoliosis, R hand tremors, past L foot ulcer.


History of Any Multi-Drug Resistant Organisms: None Reported


Past Surgical History: Back Surgery, Cholecystectomy, Heart Catheterization


Additional Past Surgical History / Comment(s): Back has 2 rods, bilateral 

cataract removals, colonoscopy, hemorrhoidectomy, EGD, pain clinic procedures.


Past Anesthesia/Blood Transfusion Reactions: No Reported Reaction


Smoking Status: Former smoker





- Past Family History


  ** Father


Additional Family Medical History / Comment(s): ETOH, never went to the doctors





  ** Mother


Family Medical History: Diabetes Mellitus





Medications and Allergies


                                Home Medications











 Medication  Instructions  Recorded  Confirmed  Type


 


Aspirin 81 mg PO HS 12/08/14 01/13/22 History


 


Baclofen [Lioresal] 10 mg PO Q6H 12/08/14 01/13/22 History


 


Butalb/Acetaminophen/Caffeine 1 - 2 tab PO QID PRN MDD 6 tabs 12/08/14 01/13/22 

History





[Fioricet -40]    


 


Sertraline [Zoloft] 50 mg PO DAILY 12/08/14 01/13/22 History


 


Carbidopa-Levodopa  mg 1 tab PO AS DIRECTED 09/22/20 01/13/22 History





[Sinemet  mg]    


 


Potassium Chloride ER [K-Dur 10] 10 meq PO DAILY 5 Days #5 09/25/20 01/13/22 Rx





 tab.er.prt   


 


Rivaroxaban [Xarelto] 15 mg PO DAILY  tab 09/25/20 01/13/22 Rx


 


Furosemide [Lasix] 20 mg PO DAILY 01/13/22 01/13/22 History


 


Metoprolol Tartrate [Lopressor] 50 mg PO BID-W/MEALS 01/13/22 01/13/22 History








                                    Allergies











Allergy/AdvReac Type Severity Reaction Status Date / Time


 


No Known Allergies Allergy   Verified 09/22/20 08:38














Physical Exam


Vitals: 


                                   Vital Signs











  Temp Pulse Pulse Resp BP BP Pulse Ox


 


 01/13/22 19:52  98.3 F   93  16   123/86  94 L


 


 01/13/22 12:17  98.4 F   89  18   120/77  93 L


 


 01/13/22 07:23   73   18  92/48   97


 


 01/13/22 07:00  97.5 F L   69  12   96/64  98


 


 01/13/22 05:39   77   18  95/72   93 L


 


 01/13/22 04:09   85   18  99/64   98


 


 01/13/22 02:57   72   18  94/59   98


 


 01/13/22 01:57   79   18  91/50   98


 


 01/12/22 23:57  99.3 F  103 H   18  94/64   90 L








                                Intake and Output











 01/13/22 01/13/22 01/13/22





 06:59 14:59 22:59


 


Other:   


 


  Voiding Method  Toilet 


 


  # Voids 1 1 1


 


  # Bowel Movements 1  1


 


  Weight 51.71 kg 51.71 kg 














Results


CBC & Chem 7: 


                                 01/13/22 00:36





                                 01/13/22 00:36


Labs: 


                  Abnormal Lab Results - Last 24 Hours (Table)











  01/13/22 01/13/22 01/13/22 Range/Units





  00:36 00:36 00:36 


 


Plt Count  113 L    (150-450)  k/uL


 


Lymphocytes #  0.3 L    (1.0-4.8)  k/uL


 


Sodium   135 L   (137-145)  mmol/L


 


Potassium   3.2 L   (3.5-5.1)  mmol/L


 


BUN   23 H   (7-17)  mg/dL


 


Glucose   125 H   (74-99)  mg/dL


 


Calcium   8.1 L   (8.4-10.2)  mg/dL


 


AST   71 H   (14-36)  U/L


 


Alkaline Phosphatase   137 H   ()  U/L


 


C-Reactive Protein   7.9 H   (<1.0)  mg/dL


 


Total Protein   6.1 L   (6.3-8.2)  g/dL


 


Albumin   3.4 L   (3.5-5.0)  g/dL


 


Coronavirus (PCR)    Detected A  (Not Detectd)  














Thrombosis Risk Factor Assmnt





- Choose All That Apply


Any of the Below Risk Factors Present?: Yes


Each Factor Represents 1 point: Serious lung disease incl. pneumonia (< 1month)


Other Risk Factors: Yes


Each Risk Factor Represents 3 Points: Age 75 years or older


Other congenital or acquired thrombophilia - If yes, enter type in comment: No


Thrombosis Risk Factor Assessment Total Risk Factor Score: 4


Thrombosis Risk Factor Assessment Level: Moderate Risk

## 2022-01-14 LAB
ALBUMIN SERPL-MCNC: 3.3 G/DL (ref 3.5–5)
ALP SERPL-CCNC: 149 U/L (ref 38–126)
ALT SERPL-CCNC: 12 U/L (ref 4–34)
ANION GAP SERPL CALC-SCNC: 7 MMOL/L
AST SERPL-CCNC: 60 U/L (ref 14–36)
BASOPHILS # BLD AUTO: 0 K/UL (ref 0–0.2)
BASOPHILS NFR BLD AUTO: 0 %
BUN SERPL-SCNC: 18 MG/DL (ref 7–17)
CALCIUM SPEC-MCNC: 8.3 MG/DL (ref 8.4–10.2)
CHLORIDE SERPL-SCNC: 107 MMOL/L (ref 98–107)
CO2 SERPL-SCNC: 25 MMOL/L (ref 22–30)
EOSINOPHIL # BLD AUTO: 0 K/UL (ref 0–0.7)
EOSINOPHIL NFR BLD AUTO: 0 %
ERYTHROCYTE [DISTWIDTH] IN BLOOD BY AUTOMATED COUNT: 4 M/UL (ref 3.8–5.4)
ERYTHROCYTE [DISTWIDTH] IN BLOOD: 13.7 % (ref 11.5–15.5)
GLUCOSE SERPL-MCNC: 105 MG/DL (ref 74–99)
HCT VFR BLD AUTO: 36.7 % (ref 34–46)
HGB BLD-MCNC: 11.7 GM/DL (ref 11.4–16)
LYMPHOCYTES # SPEC AUTO: 0.7 K/UL (ref 1–4.8)
LYMPHOCYTES NFR SPEC AUTO: 12 %
MAGNESIUM SPEC-SCNC: 1.9 MG/DL (ref 1.6–2.3)
MCH RBC QN AUTO: 29.3 PG (ref 25–35)
MCHC RBC AUTO-ENTMCNC: 31.8 G/DL (ref 31–37)
MCV RBC AUTO: 91.9 FL (ref 80–100)
MONOCYTES # BLD AUTO: 0.4 K/UL (ref 0–1)
MONOCYTES NFR BLD AUTO: 7 %
NEUTROPHILS # BLD AUTO: 4.7 K/UL (ref 1.3–7.7)
NEUTROPHILS NFR BLD AUTO: 81 %
PLATELET # BLD AUTO: 112 K/UL (ref 150–450)
POTASSIUM SERPL-SCNC: 3.5 MMOL/L (ref 3.5–5.1)
PROT SERPL-MCNC: 6.2 G/DL (ref 6.3–8.2)
SODIUM SERPL-SCNC: 139 MMOL/L (ref 137–145)
WBC # BLD AUTO: 5.9 K/UL (ref 3.8–10.6)

## 2022-01-14 RX ADMIN — RIVAROXABAN SCH MG: 15 TABLET, FILM COATED ORAL at 08:25

## 2022-01-14 RX ADMIN — METOPROLOL TARTRATE SCH MG: 50 TABLET, FILM COATED ORAL at 17:02

## 2022-01-14 RX ADMIN — CEFAZOLIN SCH: 330 INJECTION, POWDER, FOR SOLUTION INTRAMUSCULAR; INTRAVENOUS at 16:28

## 2022-01-14 RX ADMIN — BACLOFEN SCH MG: 10 TABLET ORAL at 19:50

## 2022-01-14 RX ADMIN — SERTRALINE HYDROCHLORIDE SCH MG: 50 TABLET, FILM COATED ORAL at 08:25

## 2022-01-14 RX ADMIN — CARBIDOPA AND LEVODOPA SCH EACH: 25; 100 TABLET ORAL at 12:08

## 2022-01-14 RX ADMIN — CARBIDOPA AND LEVODOPA SCH EACH: 25; 100 TABLET ORAL at 15:12

## 2022-01-14 RX ADMIN — FAMOTIDINE SCH MG: 20 TABLET, FILM COATED ORAL at 08:25

## 2022-01-14 RX ADMIN — BACLOFEN SCH MG: 10 TABLET ORAL at 12:09

## 2022-01-14 RX ADMIN — POTASSIUM CHLORIDE SCH MEQ: 750 TABLET, EXTENDED RELEASE ORAL at 08:25

## 2022-01-14 RX ADMIN — FUROSEMIDE SCH MG: 20 TABLET ORAL at 08:25

## 2022-01-14 RX ADMIN — BUTALBITAL, ACETAMINOPHEN, AND CAFFEINE PRN EACH: 50; 325; 40 TABLET ORAL at 12:09

## 2022-01-14 RX ADMIN — CARBIDOPA AND LEVODOPA SCH EACH: 25; 100 TABLET ORAL at 04:43

## 2022-01-14 RX ADMIN — FAMOTIDINE SCH MG: 20 TABLET, FILM COATED ORAL at 19:50

## 2022-01-14 RX ADMIN — CARBIDOPA AND LEVODOPA SCH EACH: 25; 100 TABLET ORAL at 17:02

## 2022-01-14 RX ADMIN — BUTALBITAL, ACETAMINOPHEN, AND CAFFEINE PRN EACH: 50; 325; 40 TABLET ORAL at 23:24

## 2022-01-14 RX ADMIN — BACLOFEN SCH MG: 10 TABLET ORAL at 06:01

## 2022-01-14 RX ADMIN — OXYCODONE HYDROCHLORIDE AND ACETAMINOPHEN SCH MG: 500 TABLET ORAL at 19:50

## 2022-01-14 RX ADMIN — CARBIDOPA AND LEVODOPA SCH EACH: 25; 100 TABLET ORAL at 08:25

## 2022-01-14 RX ADMIN — BUTALBITAL, ACETAMINOPHEN, AND CAFFEINE PRN EACH: 50; 325; 40 TABLET ORAL at 04:43

## 2022-01-14 RX ADMIN — METOPROLOL TARTRATE SCH MG: 50 TABLET, FILM COATED ORAL at 08:25

## 2022-01-14 RX ADMIN — CARBIDOPA AND LEVODOPA SCH EACH: 25; 100 TABLET ORAL at 23:10

## 2022-01-14 RX ADMIN — ASPIRIN 81 MG CHEWABLE TABLET SCH MG: 81 TABLET CHEWABLE at 19:50

## 2022-01-14 RX ADMIN — CARBIDOPA AND LEVODOPA SCH EACH: 25; 100 TABLET ORAL at 20:34

## 2022-01-14 RX ADMIN — OXYCODONE HYDROCHLORIDE AND ACETAMINOPHEN SCH MG: 500 TABLET ORAL at 12:08

## 2022-01-14 RX ADMIN — BACLOFEN SCH MG: 10 TABLET ORAL at 17:02

## 2022-01-14 RX ADMIN — CEFAZOLIN SCH MLS/HR: 330 INJECTION, POWDER, FOR SOLUTION INTRAMUSCULAR; INTRAVENOUS at 06:02

## 2022-01-14 RX ADMIN — Medication SCH MG: at 08:25

## 2022-01-14 RX ADMIN — CHOLECALCIFEROL TAB 125 MCG (5000 UNIT) SCH MCG: 125 TAB at 08:25

## 2022-01-14 NOTE — P.PN
Subjective


Progress Note Date: 01/14/22





81-year-old female patient came into the ED because of symptoms of URI and cold 

with related symptoms.  The patient also had weakness, sore throat, not feeling 

good, body aches, cough and shortness of breath.  She is not known to have any 

sick contacts.  In the ED, the patient was found to have a temperature of 99.3, 

pulse is was low as 90%.  Her chest x-ray showed cardiomegaly.  There was a 

summation patient's including rods stabilizing the lumbar spine.  There is some 

increase in dyspnea markings bilaterally.  No consolidation or airspace disease.

 White cell count is at 5.3 with a hemoglobin of 11.5 and a platelet count of 

113.  Coagulation profile was within normal limits.  Creatinine is at 23 with a 

creatinine of 0.6.  LFTs showed an AST of 71, ALT of 8, alkaline phosphatase 

137, normal electrolytes, creatinine of 0.6 and a CRP level was at 6.1 and a 

[place]ob[place] 19th testing came back positive by PCR.  The patient is currently on 

oxygen at 2 L per minute nasal cannula.  She is  vaccinated for Covid 19 x2 

without booster .  She had limited on long-term and to coagulation with Xarelto 

15 mg by mouth daily regarding history of chronic atrial fibrillation.  Her 

current EKG is showing a A. fib rhythm, rate is controlled for now.   





The patient is seen today in 01/14/2022 in follow-up on the regular medical 

floor.  She is currently resting comfortably in bed.  Awake and alert in no 

acute distress.  Maintaining good O2 saturations in the 90s on room air.  No 

worsening shortness of breath, cough or congestion.  She remains afebrile.  

Hemodynamically stable.  White count 5.9.  Hemoglobin 11.7.  Platelets 112,000. 

Lymphocytes 0.7.  D-dimer 0.63.  Sodium 139.  Potassium 3.5.  Creatinine 0.48.  

AST 60.  ALT 12.  Pro-calcitonin 0.12.  She is continued on Decadron, Xarelto, 

vitamin supplements.





Objective





- Vital Signs


Vital signs: 


                                   Vital Signs











Temp  98.5 F   01/14/22 07:00


 


Pulse  86   01/14/22 07:00


 


Resp  16   01/14/22 07:00


 


BP  144/85   01/14/22 07:00


 


Pulse Ox  96   01/14/22 08:29








                                 Intake & Output











 01/13/22 01/14/22 01/14/22





 18:59 06:59 18:59


 


Intake Total  662 


 


Balance  662 


 


Weight 51.71 kg  


 


Intake:   


 


  Intake, IV Titration  12 





  Amount   


 


    Sodium Chloride 0.9% 1,  12 





    000 ml @ 75 mls/hr IV .   





    [id number] [facility] Rx#:[id number]   


 


  Oral  650 


 


Other:   


 


  Voiding Method Toilet Bedside Commode 


 


  # Voids 1 3 


 


  # Bowel Movements  4 














- Exam





General appearance: alert, very pleasant 81-year-old female patient, in no 

apparent distress, the patient is currently on room air oxygen.


Head exam: Present: atraumatic, normocephalic, normal inspection


Eye exam: Present: normal appearance, PERRL, EOMI.  Absent: scleral icterus, 

conjunctival injection, periorbital swelling


ENT exam: Present: normal exam, mucous membranes dry


Neck exam: Present: Neck is quite flexed to the left  Absent: tenderness, 

meningismus, lymphadenopathy


Respiratory exam: Present: normal lung sounds bilaterally.  Absent: respiratory 

distress, wheezes, rales, rhonchi, stridor, and the patient has significant ky

phoscoliosis of the cervical and thoracic spine.  Her neck is quite flexed at 

this point in time.


Cardiovascular Exam: Present: normal rhythm, tachycardia, normal heart sounds.  

Absent: systolic murmur, diastolic murmur, rubs, gallop, clicks


GI/Abdominal exam: Present: soft, normal bowel sounds.  Absent: distended, 

tenderness, guarding, rebound, rigid


Extremities exam: Present: normal inspection, full ROM, normal capillary refill.

 Absent: tenderness, pedal edema, joint swelling, calf tenderness


Back exam: Present: Severe kyphoscoliosis


Neurological exam: Present: alert, oriented X3, CN II-XII intact


Psychiatric exam: Present: normal affect, normal mood


Skin exam: Present: warm, dry, intact, normal color.  Absent: rash





- Labs


CBC & Chem 7: 


                                 01/14/22 08:05





                                 01/14/22 08:05


Labs: 


                  Abnormal Lab Results - Last 24 Hours (Table)











  01/13/22 01/14/22 01/14/22 Range/Units





  12:28 08:05 08:05 


 


Plt Count   112 L   (150-450)  k/uL


 


Lymphocytes #   0.7 L   (1.0-4.8)  k/uL


 


D-Dimer     (<0.60)  mg/L FEU


 


BUN    18 H  (7-17)  mg/dL


 


Creatinine    0.48 L  (0.52-1.04)  mg/dL


 


Glucose    105 H  (74-99)  mg/dL


 


Calcium    8.3 L  (8.4-10.2)  mg/dL


 


AST    60 H  (14-36)  U/L


 


Alkaline Phosphatase    149 H  ()  U/L


 


C-Reactive Protein    6.5 H  (<1.0)  mg/dL


 


Total Protein    6.2 L  (6.3-8.2)  g/dL


 


Albumin    3.3 L  (3.5-5.0)  g/dL


 


Procalcitonin  0.12 H    (0.02-0.09)  ng/mL














  01/14/22 Range/Units





  08:05 


 


Plt Count   (150-450)  k/uL


 


Lymphocytes #   (1.0-4.8)  k/uL


 


D-Dimer  0.63 H  (<0.60)  mg/L FEU


 


BUN   (7-17)  mg/dL


 


Creatinine   (0.52-1.04)  mg/dL


 


Glucose   (74-99)  mg/dL


 


Calcium   (8.4-10.2)  mg/dL


 


AST   (14-36)  U/L


 


Alkaline Phosphatase   ()  U/L


 


C-Reactive Protein   (<1.0)  mg/dL


 


Total Protein   (6.3-8.2)  g/dL


 


Albumin   (3.5-5.0)  g/dL


 


Procalcitonin   (0.02-0.09)  ng/mL














Assessment and Plan


Assessment: 





1 acute COVID 19 infection in 7-year-old female patient who has widely received 

a vaccination for COVID 19 and the patient has received the message RNA Pfizer 

vaccine without a booster.  The patient is presenting with constitutional 

symptoms along with some cough and shortness of breath related to the infection.

 Chest x-ray does not show clear signs of an infection/pneumonia.  The patient's

on Room Air Oxygen.  Her Pulse Ox Is Maintained above 90%.  Pro-calcitonin 0.12.

 The Exact timing for her symptom onset cannot be clearly established.  The 

patient has been sick probably for less than a week.





2 chronic atrial fibrillation method on long-term anticoagulation with Xarelto





3 cervical and thoracic kyphoscoliosis





4 thrombocytopenia, a chronic problem for this patient





Plan





The patient was seen and evaluated


Stable and on room air


Cleared for discharge from the pulmonary standpoint





I, the cosigning physician, performed a history & physical examination of the 

patient. Lungs sounds are clear.  Maintaining good O2 saturations in the 90s on 

room air.  I discussed the assessment and plan of care with my nurse 

practitioner, Christy Willoughby. I attest to the above note as dictated by her.

## 2022-01-14 NOTE — P.PN
Progress Note - Text


Progress Note Date: 01/14/22





Chief Complaint: Feeling unwell





This is a 81-year-old patient, Dr. Michael Painting.  Chronic stable medical 

conditions include atrial fibrillation, GERD, hypertension, mitral valve 

prolapse, chronic low back pain with any dizziness, scoliosis, right hand 

tremors, anxiety depression.  Does use a walker to ambulate.


Per the EMS/ER report patient was not able to ambulate without assistance which 

is not normal.  Decrease eating and drinking at least the last 23 days.  Patient

also has a sore throat low-grade fever for at least 2 days.  Cough.  No sputum. 

Pulse ox initially noted was 89%.  Tired rundown.


Admitted with COVID 19 pneumonitis and mild hypoxic failure.


January 14: Feeling better.  Eating about 25-50%.  Does feel tired.  And weak.  

Daughter was called and she has not comfortable taking the patient home.  PTOT 

been consulted.  Discharged postponed until further evaluation by PT OT.





Review of systems: Was done for constitutional, cardiovascular, GI, pulmonary. 

relevant finding as above





Active Medications





Acetaminophen (Acetaminophen Tab 325 Mg Tab)  650 mg PO Q4HR PRN


   PRN Reason: Fever and/ or Pain


Acetaminophen/Butalbital/Caffeine (Butalb/Apap/Caff -40mg Tab)  1 each PO 

QID PRN


   PRN Reason: Migraine Headache


   Last Admin: 01/14/22 12:09 Dose:  1 each


   Documented by: 


Alprazolam (Alprazolam 0.25 Mg Tab)  0.25 mg PO Q6HR PRN


   PRN Reason: Anxiety


Ascorbic Acid (Ascorbic Acid 500 Mg Tab)  500 mg PO BID [facility]


   Last Admin: 01/14/22 12:08 Dose:  500 mg


   Documented by: 


Aspirin (Aspirin 81 Mg)  81 mg PO HS [facility]


   Last Admin: 01/13/22 21:47 Dose:  81 mg


   Documented by: 


Baclofen (Baclofen 10 Mg Tab)  10 mg PO Q6H [facility]


   Last Admin: 01/14/22 17:02 Dose:  10 mg


   Documented by: 


Calcium Carbonate/Glycine (Calcium Carbonate 500 Mg Chewable)  1,000 mg PO Q4HR 

PRN


   PRN Reason: Dyspepsia


   Last Admin: 01/13/22 21:50 Dose:  1,000 mg


   Documented by: 


Carbidopa/Levodopa (Carbidopa-Levodopa  Mg 1 Each Tab)  1 each PO 

0500,0800,1100,1400 [facility]


   Last Admin: 01/14/22 15:12 Dose:  1 each


   Documented by: 


Carbidopa/Levodopa (Carbidopa-Levodopa  Mg 1 Each Tab)  1 each PO 

1700,2000,2300 [facility]


   Last Admin: 01/14/22 17:02 Dose:  1 each


   Documented by: 


Cholecalciferol (Cholecalciferol 125 Mcg (5000 Iu) Tablet)  125 mcg PO DAILY [facility]


   Last Admin: 01/14/22 08:25 Dose:  125 mcg


   Documented by: 


Dexamethasone (Dexamethasone 2 Mg Tab)  6 mg PO DAILY [facility]


   Last Admin: 01/14/22 08:25 Dose:  6 mg


   Documented by: 


Famotidine (Famotidine 20 Mg Tab)  20 mg PO BID [facility]


   Last Admin: 01/14/22 08:25 Dose:  20 mg


   Documented by: 


Furosemide (Furosemide 20 Mg Tab)  20 mg PO DAILY [facility]


   Last Admin: 01/14/22 08:25 Dose:  20 mg


   Documented by: 


Sodium Chloride (Saline 0.9%)  1,000 mls @ 75 mls/hr IV .[id number] [facility]


   Last Admin: 01/14/22 16:28 Dose:  Not Given


   Documented by: 


Lactulose (Lactulose 20 Gm/30 Ml Cup)  20 gm PO DAILY PRN


   PRN Reason: Constipation


   Last Admin: 01/13/22 21:50 Dose:  20 gm


   Documented by: 


Melatonin (Melatonin 3 Mg Tablet)  3 mg PO HS PRN


   PRN Reason: Insomnia


Metoprolol Tartrate (Metoprolol Tartrate 50 Mg Tab)  50 mg PO BID-W/MEALS [facility]


   Last Admin: 01/14/22 17:02 Dose:  50 mg


   Documented by: 


Naloxone HCl (Naloxone 0.4 Mg/Ml 1 Ml Vial)  0.2 mg IV Q2M PRN


   PRN Reason: Opioid Reversal


Ondansetron HCl (Ondansetron 4 Mg/2 Ml Vial)  4 mg IVP Q8HR PRN


   PRN Reason: Nausea And Vomiting


Potassium Chloride (Potassium Chloride Er 10 Meq Tab.Er.Prt)  10 meq PO DAILY 

[facility]


   Last Admin: 01/14/22 08:25 Dose:  10 meq


   Documented by: 


Rivaroxaban (Rivaroxaban 15 Mg Tab)  15 mg PO DAILY [facility]; Protocol


   Last Admin: 01/14/22 08:25 Dose:  15 mg


   Documented by: 


Sertraline HCl (Sertraline 50 Mg Tab)  50 mg PO DAILY [facility]


   Last Admin: 01/14/22 08:25 Dose:  50 mg


   Documented by: 


Tramadol HCl (Tramadol 50 Mg Tab)  50 mg PO Q6H PRN


   PRN Reason: Moderate Pain


Zinc Sulfate (Zinc Sulfate 220 Mg Cap)  220 mg PO DAILY [facility]


   Last Admin: 01/14/22 08:25 Dose:  220 mg


   Documented by: 





Past medical history to include:


Atrial fibrillation, GERD, hypertension, mitral valve prolapse, colitis, lower 

back herniated disc, scoliosis, right hand tremor, pain clinic procedures, 

anxiety depression





Social history:


Patient lives with her daughter.  Does use a walker.  Patient smoked for 20 

years stopped in 1986.  No alcohol.





Family history:


Diabetes, alcohol





Physical examination:


VITAL SIGNS: 98.4, 97, 16, 138/90, 95% room air


GENERAL: , laying in bed, more awake, neck tilted to left.


EYES: Pupils equal.  Conjunctiva normal.


HEENT: External appearance of nose and ears normal, oral cavity dry mucous 

membranes.


NECK: JVD not raised; masses not palpable.


HEART: First and second heart sounds are normal;  no edema.  


LUNGS: Respiratory rate increased; decreased breath sounds, 


ABDOMEN: Soft,  nontender, liver spleen not palpable, no masses palpable.  


PSYCH: Alert and oriented x3;  mood  and affect normal.  


MUSCULOSKELETAL:No Clubbing/cyanosis;muscles-grossly intact.  Neck tilted to 

left


NEUROLOGICAL: Cranial nerves grossly intact; no facial asymmetry,   power and 

sensation grossly intact. 








INVESTIGATIONS, reviewed in the clinical context:


January 14: White count 5.9 hemoglobin 11.7 platelets 112 d-dimer 0.63 potassium

3.5 BUN 18 creatinine 0.48


White count 5.3 hemoglobin 11.5 platelets 113 sodium 135 potassium 3.2.  23 

creatinine 0.61 albumin 3.4


Coronavirus [PCR]: Not detected


EKG tracing personally reviewed by me-atrial flutter/fibrillation.  Nonspecific 

ST segment changes.  Rate 95


Chest x-ray film personally reviewed by me-possible infiltrate.  Some 

cardiomegaly.





Assessment and plan:





-Acute COVID 19 pneumonitis.


Vitamin C, vitamin D, zinc.  On xarelto





-Acute hypoxic respiratory failure will pulse ox 89% at home and 90% on room air

 upon presentation.:  Better


Dexamethasone 6 mg daily.  Supplement oxygen to keep pulse ox above 92%





-Persistent atrial flutter fibrillation, controlled


Continue xarelto.  Lopressor 50 mg 3 times a day





-GERD


Pepcid 20 mg twice a day





-Chronic gait dysfunction, uses a walker


Activity as tolerated





-Anxiety/depression


Zoloft 50 mg a day





-Chronic muscle spasms


Baclofen 10 mg every 6





-Acute medical debility.


PT OT consulted.





Medications to continue.  PTOT.   to follow.

## 2022-01-15 RX ADMIN — METOPROLOL TARTRATE SCH MG: 50 TABLET, FILM COATED ORAL at 17:31

## 2022-01-15 RX ADMIN — ASPIRIN 81 MG CHEWABLE TABLET SCH MG: 81 TABLET CHEWABLE at 19:56

## 2022-01-15 RX ADMIN — CARBIDOPA AND LEVODOPA SCH EACH: 25; 100 TABLET ORAL at 05:20

## 2022-01-15 RX ADMIN — OXYCODONE HYDROCHLORIDE AND ACETAMINOPHEN SCH MG: 500 TABLET ORAL at 19:56

## 2022-01-15 RX ADMIN — SERTRALINE HYDROCHLORIDE SCH MG: 50 TABLET, FILM COATED ORAL at 08:21

## 2022-01-15 RX ADMIN — Medication SCH MG: at 08:21

## 2022-01-15 RX ADMIN — OXYCODONE HYDROCHLORIDE AND ACETAMINOPHEN SCH MG: 500 TABLET ORAL at 08:21

## 2022-01-15 RX ADMIN — CARBIDOPA AND LEVODOPA SCH EACH: 25; 100 TABLET ORAL at 08:56

## 2022-01-15 RX ADMIN — CARBIDOPA AND LEVODOPA SCH EACH: 25; 100 TABLET ORAL at 17:31

## 2022-01-15 RX ADMIN — CARBIDOPA AND LEVODOPA SCH EACH: 25; 100 TABLET ORAL at 14:34

## 2022-01-15 RX ADMIN — RIVAROXABAN SCH MG: 15 TABLET, FILM COATED ORAL at 08:55

## 2022-01-15 RX ADMIN — BUTALBITAL, ACETAMINOPHEN, CAFFEINE, AND CODEINE PHOSPHATE PRN EACH: 30; 50; 40; 325 CAPSULE ORAL at 17:59

## 2022-01-15 RX ADMIN — CHOLECALCIFEROL TAB 125 MCG (5000 UNIT) SCH MCG: 125 TAB at 08:21

## 2022-01-15 RX ADMIN — CEFAZOLIN SCH: 330 INJECTION, POWDER, FOR SOLUTION INTRAMUSCULAR; INTRAVENOUS at 08:21

## 2022-01-15 RX ADMIN — METOPROLOL TARTRATE SCH MG: 50 TABLET, FILM COATED ORAL at 08:22

## 2022-01-15 RX ADMIN — BACLOFEN SCH MG: 10 TABLET ORAL at 05:20

## 2022-01-15 RX ADMIN — CEFAZOLIN SCH: 330 INJECTION, POWDER, FOR SOLUTION INTRAMUSCULAR; INTRAVENOUS at 19:56

## 2022-01-15 RX ADMIN — BACLOFEN SCH MG: 10 TABLET ORAL at 12:14

## 2022-01-15 RX ADMIN — BACLOFEN SCH MG: 10 TABLET ORAL at 17:31

## 2022-01-15 RX ADMIN — BUTALBITAL, ACETAMINOPHEN, AND CAFFEINE PRN EACH: 50; 325; 40 TABLET ORAL at 06:14

## 2022-01-15 RX ADMIN — FAMOTIDINE SCH MG: 20 TABLET, FILM COATED ORAL at 19:56

## 2022-01-15 RX ADMIN — CARBIDOPA AND LEVODOPA SCH EACH: 25; 100 TABLET ORAL at 12:14

## 2022-01-15 RX ADMIN — CARBIDOPA AND LEVODOPA SCH EACH: 25; 100 TABLET ORAL at 19:56

## 2022-01-15 RX ADMIN — FAMOTIDINE SCH MG: 20 TABLET, FILM COATED ORAL at 08:21

## 2022-01-15 RX ADMIN — FUROSEMIDE SCH MG: 20 TABLET ORAL at 08:21

## 2022-01-15 RX ADMIN — POTASSIUM CHLORIDE SCH MEQ: 750 TABLET, EXTENDED RELEASE ORAL at 08:21

## 2022-01-15 NOTE — P.PN
Progress Note - Text


Progress Note Date: 01/15/22





Chief Complaint: Feeling unwell





This is a 81-year-old patient, Dr. Michael Painting.  Chronic stable medical 

conditions include atrial fibrillation, GERD, hypertension, mitral valve 

prolapse, chronic low back pain with any dizziness, scoliosis, right hand 

tremors, anxiety depression.  Does use a walker to ambulate.


Per the EMS/ER report patient was not able to ambulate without assistance which 

is not normal.  Decrease eating and drinking at least the last 23 days.  Patient

also has a sore throat low-grade fever for at least 2 days.  Cough.  No sputum. 

Pulse ox initially noted was 89%.  Tired rundown.


Admitted with COVID 19 pneumonitis and mild hypoxic failure.


January 14: Feeling better.  Eating about 25-50%.  Does feel tired.  And weak.  

Daughter was called and she has not comfortable taking the patient home.  PTOT 

been consulted.  Discharged postponed until further evaluation by PT OT.


January 15: Eating better.  Tired.  Seen by PT OT.  Recommended [facility].  

Social work consulted.  Breathing stable.





Review of systems: Was done for constitutional, cardiovascular, GI, pulmonary. 

relevant finding as above





Active Medications





Acetam/Butalbital/Caffeine/Codeine (Buta/Apap/Caf/Cod -93-30 Cap)  1 each 

PO Q6HR PRN


   PRN Reason: Headache


Acetaminophen (Acetaminophen Tab 325 Mg Tab)  650 mg PO Q4HR PRN


   PRN Reason: Fever and/ or Pain


Alprazolam (Alprazolam 0.25 Mg Tab)  0.25 mg PO Q6HR PRN


   PRN Reason: Anxiety


Ascorbic Acid (Ascorbic Acid 500 Mg Tab)  500 mg PO BID [facility]


   Last Admin: 01/15/22 08:21 Dose:  500 mg


   Documented by: 


Aspirin (Aspirin 81 Mg)  81 mg PO HS [facility]


   Last Admin: 01/14/22 19:50 Dose:  81 mg


   Documented by: 


Baclofen (Baclofen 10 Mg Tab)  10 mg PO Q6H [facility]


   Last Admin: 01/15/22 12:14 Dose:  10 mg


   Documented by: 


Calcium Carbonate/Glycine (Calcium Carbonate 500 Mg Chewable)  1,000 mg PO Q4HR 

PRN


   PRN Reason: Dyspepsia


   Last Admin: 01/13/22 21:50 Dose:  1,000 mg


   Documented by: 


Carbidopa/Levodopa (Carbidopa-Levodopa  Mg 1 Each Tab)  1 each PO 

0500,0800,1100,1400 [facility]


   Last Admin: 01/15/22 14:34 Dose:  1 each


   Documented by: 


Carbidopa/Levodopa (Carbidopa-Levodopa  Mg 1 Each Tab)  1 each PO 

1700,2000,2300 [facility]


   Last Admin: 01/14/22 23:10 Dose:  1 each


   Documented by: 


Cholecalciferol (Cholecalciferol 125 Mcg (5000 Iu) Tablet)  125 mcg PO DAILY [facility]


   Last Admin: 01/15/22 08:21 Dose:  125 mcg


   Documented by: 


Dexamethasone (Dexamethasone 2 Mg Tab)  6 mg PO DAILY [facility]


   Last Admin: 01/15/22 08:21 Dose:  6 mg


   Documented by: 


Famotidine (Famotidine 20 Mg Tab)  20 mg PO BID [facility]


   Last Admin: 01/15/22 08:21 Dose:  20 mg


   Documented by: 


Furosemide (Furosemide 20 Mg Tab)  20 mg PO DAILY [facility]


   Last Admin: 01/15/22 08:21 Dose:  20 mg


   Documented by: 


Sodium Chloride (Saline 0.9%)  1,000 mls @ 75 mls/hr IV .[id number] [facility]


   Last Admin: 01/15/22 08:21 Dose:  Not Given


   Documented by: 


Lactulose (Lactulose 20 Gm/30 Ml Cup)  20 gm PO DAILY PRN


   PRN Reason: Constipation


   Last Admin: 01/13/22 21:50 Dose:  20 gm


   Documented by: 


Melatonin (Melatonin 3 Mg Tablet)  3 mg PO HS PRN


   PRN Reason: Insomnia


Metoprolol Tartrate (Metoprolol Tartrate 50 Mg Tab)  50 mg PO BID-W/MEALS [facility]


   Last Admin: 01/15/22 08:22 Dose:  50 mg


   Documented by: 


Naloxone HCl (Naloxone 0.4 Mg/Ml 1 Ml Vial)  0.2 mg IV Q2M PRN


   PRN Reason: Opioid Reversal


Ondansetron HCl (Ondansetron 4 Mg/2 Ml Vial)  4 mg IVP Q8HR PRN


   PRN Reason: Nausea And Vomiting


Potassium Chloride (Potassium Chloride Er 10 Meq Tab.Er.Prt)  10 meq PO DAILY 

[facility]


   Last Admin: 01/15/22 08:21 Dose:  10 meq


   Documented by: 


Rivaroxaban (Rivaroxaban 15 Mg Tab)  15 mg PO DAILY [facility]; Protocol


   Last Admin: 01/15/22 08:55 Dose:  15 mg


   Documented by: 


Sertraline HCl (Sertraline 50 Mg Tab)  50 mg PO DAILY [facility]


   Last Admin: 01/15/22 08:21 Dose:  50 mg


   Documented by: 


Tramadol HCl (Tramadol 50 Mg Tab)  50 mg PO Q6H PRN


   PRN Reason: Moderate Pain


Zinc Sulfate (Zinc Sulfate 220 Mg Cap)  220 mg PO DAILY [facility]


   Last Admin: 01/15/22 08:21 Dose:  220 mg


   Documented by: 











Past medical history to include:


Atrial fibrillation, GERD, hypertension, mitral valve prolapse, colitis, lower 

back herniated disc, scoliosis, right hand tremor, pain clinic procedures, 

anxiety depression





Social history:


Patient lives with her daughter.  Does use a walker.  Patient smoked for 20 

years stopped in 1986.  No alcohol.





Family history:


Diabetes, alcohol





Physical examination:


VITAL SIGNS: 87.9, 75, 17, 129/88, 97% room air


GENERAL: , laying in bed,  awake, neck tilted to left.


EYES: Pupils equal.  Conjunctiva normal.


HEENT: External appearance of nose and ears normal, oral cavity dry mucous 

membranes.


NECK: JVD not raised; masses not palpable.


HEART: First and second heart sounds are normal;  no edema.  


LUNGS: Respiratory rate normal, decreased breath sounds, 


ABDOMEN: Soft,  nontender, liver spleen not palpable, no masses palpable.  


PSYCH: Alert and oriented x3;  mood  and affect normal.  


MUSCULOSKELETAL:No Clubbing/cyanosis;muscles-grossly intact.  Neck tilted to 

left


NEUROLOGICAL: Cranial nerves grossly intact; no facial asymmetry,   power and 

sensation grossly intact. 








INVESTIGATIONS, reviewed in the clinical context:


January 14: White count 5.9 hemoglobin 11.7 platelets 112 d-dimer 0.63 potassium

3.5 BUN 18 creatinine 0.48


White count 5.3 hemoglobin 11.5 platelets 113 sodium 135 potassium 3.2.  23 

creatinine 0.61 albumin 3.4


Coronavirus [PCR]: Not detected


EKG tracing personally reviewed by me-atrial flutter/fibrillation.  Nonspecific 

ST segment changes.  Rate 95


Chest x-ray film personally reviewed by me-possible infiltrate.  Some 

cardiomegaly.





Assessment and plan:





-Acute COVID 19 pneumonitis.


Vitamin C, vitamin D, zinc.  On xarelto





-Acute hypoxic respiratory failure will pulse ox 89% at home and 90% on room air

 upon presentation.:  Better


Dexamethasone 6 mg daily.  Supplement oxygen to keep pulse ox above 92%





-Persistent atrial flutter fibrillation, controlled


Continue xarelto.  Lopressor 50 mg 3 times a day





-GERD


Pepcid 20 mg twice a day





-Chronic gait dysfunction, uses a walker


Activity as tolerated





-Anxiety/depression


Zoloft 50 mg a day





-Chronic muscle spasms


Baclofen 10 mg every 6





-Acute medical debility.


PT OT: Recommend [facility]





Medications to continue.  PTOT.  Consult Dr. Bullard.   consulted for

 [facility].  Discussed with patient.

## 2022-01-16 RX ADMIN — FAMOTIDINE SCH MG: 20 TABLET, FILM COATED ORAL at 08:20

## 2022-01-16 RX ADMIN — BACLOFEN SCH MG: 10 TABLET ORAL at 00:04

## 2022-01-16 RX ADMIN — BACLOFEN SCH MG: 10 TABLET ORAL at 11:59

## 2022-01-16 RX ADMIN — BUTALBITAL, ACETAMINOPHEN, CAFFEINE, AND CODEINE PHOSPHATE PRN EACH: 30; 50; 40; 325 CAPSULE ORAL at 00:04

## 2022-01-16 RX ADMIN — CARBIDOPA AND LEVODOPA SCH EACH: 25; 100 TABLET ORAL at 14:34

## 2022-01-16 RX ADMIN — SERTRALINE HYDROCHLORIDE SCH MG: 50 TABLET, FILM COATED ORAL at 08:20

## 2022-01-16 RX ADMIN — BUTALBITAL, ACETAMINOPHEN, CAFFEINE, AND CODEINE PHOSPHATE PRN EACH: 30; 50; 40; 325 CAPSULE ORAL at 05:28

## 2022-01-16 RX ADMIN — OXYCODONE HYDROCHLORIDE AND ACETAMINOPHEN SCH MG: 500 TABLET ORAL at 20:39

## 2022-01-16 RX ADMIN — CEFAZOLIN SCH: 330 INJECTION, POWDER, FOR SOLUTION INTRAMUSCULAR; INTRAVENOUS at 20:40

## 2022-01-16 RX ADMIN — OXYCODONE HYDROCHLORIDE AND ACETAMINOPHEN SCH MG: 500 TABLET ORAL at 08:20

## 2022-01-16 RX ADMIN — BUTALBITAL, ACETAMINOPHEN, CAFFEINE, AND CODEINE PHOSPHATE PRN EACH: 30; 50; 40; 325 CAPSULE ORAL at 11:59

## 2022-01-16 RX ADMIN — CARBIDOPA AND LEVODOPA SCH EACH: 25; 100 TABLET ORAL at 08:21

## 2022-01-16 RX ADMIN — Medication SCH MG: at 08:20

## 2022-01-16 RX ADMIN — CARBIDOPA AND LEVODOPA SCH EACH: 25; 100 TABLET ORAL at 05:29

## 2022-01-16 RX ADMIN — ASPIRIN 81 MG CHEWABLE TABLET SCH MG: 81 TABLET CHEWABLE at 20:39

## 2022-01-16 RX ADMIN — CHOLECALCIFEROL TAB 125 MCG (5000 UNIT) SCH MCG: 125 TAB at 08:21

## 2022-01-16 RX ADMIN — METOPROLOL TARTRATE SCH MG: 50 TABLET, FILM COATED ORAL at 08:20

## 2022-01-16 RX ADMIN — POTASSIUM CHLORIDE SCH MEQ: 750 TABLET, EXTENDED RELEASE ORAL at 08:20

## 2022-01-16 RX ADMIN — CEFAZOLIN SCH: 330 INJECTION, POWDER, FOR SOLUTION INTRAMUSCULAR; INTRAVENOUS at 12:02

## 2022-01-16 RX ADMIN — BACLOFEN SCH MG: 10 TABLET ORAL at 05:29

## 2022-01-16 RX ADMIN — FUROSEMIDE SCH MG: 20 TABLET ORAL at 08:20

## 2022-01-16 RX ADMIN — CARBIDOPA AND LEVODOPA SCH EACH: 25; 100 TABLET ORAL at 17:25

## 2022-01-16 RX ADMIN — BACLOFEN SCH MG: 10 TABLET ORAL at 17:25

## 2022-01-16 RX ADMIN — CARBIDOPA AND LEVODOPA SCH EACH: 25; 100 TABLET ORAL at 20:39

## 2022-01-16 RX ADMIN — FAMOTIDINE SCH MG: 20 TABLET, FILM COATED ORAL at 20:39

## 2022-01-16 RX ADMIN — METOPROLOL TARTRATE SCH MG: 50 TABLET, FILM COATED ORAL at 17:25

## 2022-01-16 RX ADMIN — RIVAROXABAN SCH MG: 15 TABLET, FILM COATED ORAL at 08:21

## 2022-01-16 RX ADMIN — CARBIDOPA AND LEVODOPA SCH EACH: 25; 100 TABLET ORAL at 11:59

## 2022-01-16 RX ADMIN — BUTALBITAL, ACETAMINOPHEN, CAFFEINE, AND CODEINE PHOSPHATE PRN EACH: 30; 50; 40; 325 CAPSULE ORAL at 20:39

## 2022-01-16 RX ADMIN — CARBIDOPA AND LEVODOPA SCH EACH: 25; 100 TABLET ORAL at 00:05

## 2022-01-16 NOTE — P.PN
Progress Note - Text


Progress Note Date: 01/16/22





Chief Complaint: Feeling unwell





This is a 81-year-old patient, Dr. Michael Painting.  Chronic stable medical 

conditions include atrial fibrillation, GERD, hypertension, mitral valve 

prolapse, chronic low back pain with any dizziness, scoliosis, right hand 

tremors, anxiety depression.  Does use a walker to ambulate.


Per the EMS/ER report patient was not able to ambulate without assistance which 

is not normal.  Decrease eating and drinking at least the last 23 days.  Patient

also has a sore throat low-grade fever for at least 2 days.  Cough.  No sputum. 

Pulse ox initially noted was 89%.  Tired rundown.


Admitted with COVID 19 pneumonitis and mild hypoxic failure.


January 14: Feeling better.  Eating about 25-50%.  Does feel tired.  And weak.  

Daughter was called and she has not comfortable taking the patient home.  PTOT 

been consulted.  Discharged postponed until further evaluation by PT OT.


January 15: Eating better.  Tired.  Seen by PT OT.  Recommended [facility].  

Social work consulted.  Breathing stable.


January 16: Oral intake fair.  Patient's today's wondering if she can go home.  

We will await PTOT recommendation from tomorrow.  





Review of systems: Was done for constitutional, cardiovascular, GI, pulmonary. 

relevant finding as above





Active Medications





Acetam/Butalbital/Caffeine/Codeine (Buta/Apap/Caf/Cod -53-30 Cap)  1 each 

PO Q6HR PRN


   PRN Reason: Headache


   Last Admin: 01/16/22 11:59 Dose:  1 each


   Documented by: 


Acetaminophen (Acetaminophen Tab 325 Mg Tab)  650 mg PO Q4HR PRN


   PRN Reason: Fever and/ or Pain


Alprazolam (Alprazolam 0.25 Mg Tab)  0.25 mg PO Q6HR PRN


   PRN Reason: Anxiety


Ascorbic Acid (Ascorbic Acid 500 Mg Tab)  500 mg PO BID [facility]


   Last Admin: 01/16/22 08:20 Dose:  500 mg


   Documented by: 


Aspirin (Aspirin 81 Mg)  81 mg PO HS [facility]


   Last Admin: 01/15/22 19:56 Dose:  81 mg


   Documented by: 


Baclofen (Baclofen 10 Mg Tab)  10 mg PO Q6H [facility]


   Last Admin: 01/16/22 11:59 Dose:  10 mg


   Documented by: 


Calcium Carbonate/Glycine (Calcium Carbonate 500 Mg Chewable)  1,000 mg PO Q4HR 

PRN


   PRN Reason: Dyspepsia


   Last Admin: 01/13/22 21:50 Dose:  1,000 mg


   Documented by: 


Carbidopa/Levodopa (Carbidopa-Levodopa  Mg 1 Each Tab)  1 each PO 

0500,0800,1100,1400 [facility]


   Last Admin: 01/16/22 14:34 Dose:  1 each


   Documented by: 


Carbidopa/Levodopa (Carbidopa-Levodopa  Mg 1 Each Tab)  1 each PO 

1700,2000,2300 [facility]


   Last Admin: 01/16/22 00:05 Dose:  1 each


   Documented by: 


Cholecalciferol (Cholecalciferol 125 Mcg (5000 Iu) Tablet)  125 mcg PO DAILY [facility]


   Last Admin: 01/16/22 08:21 Dose:  125 mcg


   Documented by: 


Dexamethasone (Dexamethasone 2 Mg Tab)  6 mg PO DAILY [facility]


   Last Admin: 01/16/22 08:20 Dose:  6 mg


   Documented by: 


Famotidine (Famotidine 20 Mg Tab)  20 mg PO BID [facility]


   Last Admin: 01/16/22 08:20 Dose:  20 mg


   Documented by: 


Furosemide (Furosemide 20 Mg Tab)  20 mg PO DAILY [facility]


   Last Admin: 01/16/22 08:20 Dose:  20 mg


   Documented by: 


Sodium Chloride (Saline 0.9%)  1,000 mls @ 75 mls/hr IV .[id number] [facility]


   Last Admin: 01/16/22 12:02 Dose:  Not Given


   Documented by: 


Lactulose (Lactulose 20 Gm/30 Ml Cup)  20 gm PO DAILY PRN


   PRN Reason: Constipation


   Last Admin: 01/13/22 21:50 Dose:  20 gm


   Documented by: 


Melatonin (Melatonin 3 Mg Tablet)  3 mg PO HS PRN


   PRN Reason: Insomnia


Metoprolol Tartrate (Metoprolol Tartrate 50 Mg Tab)  50 mg PO BID-W/MEALS [facility]


   Last Admin: 01/16/22 08:20 Dose:  50 mg


   Documented by: 


Naloxone HCl (Naloxone 0.4 Mg/Ml 1 Ml Vial)  0.2 mg IV Q2M PRN


   PRN Reason: Opioid Reversal


Ondansetron HCl (Ondansetron 4 Mg/2 Ml Vial)  4 mg IVP Q8HR PRN


   PRN Reason: Nausea And Vomiting


Potassium Chloride (Potassium Chloride Er 10 Meq Tab.Er.Prt)  10 meq PO DAILY 

[facility]


   Last Admin: 01/16/22 08:20 Dose:  10 meq


   Documented by: 


Rivaroxaban (Rivaroxaban 15 Mg Tab)  15 mg PO DAILY [facility]; Protocol


   Last Admin: 01/16/22 08:21 Dose:  15 mg


   Documented by: 


Sertraline HCl (Sertraline 50 Mg Tab)  50 mg PO DAILY [facility]


   Last Admin: 01/16/22 08:20 Dose:  50 mg


   Documented by: 


Tramadol HCl (Tramadol 50 Mg Tab)  50 mg PO Q6H PRN


   PRN Reason: Moderate Pain


   Last Admin: 01/15/22 20:05 Dose:  50 mg


   Documented by: 


Zinc Sulfate (Zinc Sulfate 220 Mg Cap)  220 mg PO DAILY [facility]


   Last Admin: 01/16/22 08:20 Dose:  220 mg


   Documented by: 











Past medical history to include:


Atrial fibrillation, GERD, hypertension, mitral valve prolapse, colitis, lower 

back herniated disc, scoliosis, right hand tremor, pain clinic procedures, 

anxiety depression





Social history:


Patient lives with her daughter.  Does use a walker.  Patient smoked for 20 

years stopped in 1986.  No alcohol.





Family history:


Diabetes, alcohol





Physical examination:


VITAL SIGNS: 97.9, 96, 18, 115/76, 93% room air


GENERAL: , laying in bed,  awake, neck tilted to left.


EYES: Pupils equal.  Conjunctiva normal.


HEENT: External appearance of nose and ears normal, oral cavity dry mucous 

membranes.


NECK: JVD not raised; masses not palpable.


HEART: First and second heart sounds are normal;  no edema.  


LUNGS: Respiratory rate normal, decreased breath sounds, 


ABDOMEN: Soft,  nontender, liver spleen not palpable, no masses palpable.  


PSYCH: Alert and oriented x3;  mood  and affect normal.  


MUSCULOSKELETAL:No Clubbing/cyanosis;muscles-grossly intact.  Neck tilted to 

left


NEUROLOGICAL: Cranial nerves grossly intact; no facial asymmetry,   power and 

sensation grossly intact. 








INVESTIGATIONS, reviewed in the clinical context:


January 14: White count 5.9 hemoglobin 11.7 platelets 112 d-dimer 0.63 potassium

3.5 BUN 18 creatinine 0.48


White count 5.3 hemoglobin 11.5 platelets 113 sodium 135 potassium 3.2.  23 

creatinine 0.61 albumin 3.4


Coronavirus [PCR]: Not detected


EKG tracing personally reviewed by me-atrial flutter/fibrillation.  Nonspecific 

ST segment changes.  Rate 95


Chest x-ray film personally reviewed by me-possible infiltrate.  Some 

cardiomegaly.





Assessment and plan:





-Acute COVID 19 pneumonitis.


Vitamin C, vitamin D, zinc.  On xarelto





-Acute hypoxic respiratory failure will pulse ox 89% at home and 90% on room air

 upon presentation.:  Better


Dexamethasone 6 mg daily.  Supplement oxygen to keep pulse ox above 92%





-Persistent atrial flutter fibrillation, controlled


Continue xarelto.  Lopressor 50 mg 3 times a day





-GERD


Pepcid 20 mg twice a day





-Chronic gait dysfunction, uses a walker


Activity as tolerated





-Anxiety/depression


Zoloft 50 mg a day





-Chronic muscle spasms


Baclofen 10 mg every 6





-Acute medical debility.


PT OT: Recommend [facility]





Continue current medications.  Discussed with patient.  Patient is wondering if 

she can go home.  Awake PTOT input from tomorrow.

## 2022-01-16 NOTE — P.GSCN
History of Present Illness


Consult date: 01/16/22


Reason for Consult: 





Rectal prolapse


History of present illness: 





Is an 81-year-old female with admission to the hospital for possible pneumonia. 

Patient states that she's had rectal prolapse for several months.  She describes

approximately 4-5 inches of rectum prolapsing.  She's had some bleeding from 

this.





Past Medical History


Past Medical History: Atrial Fibrillation, Eye Disorder, GERD/Reflux, Hyp

ertension, Mitral Valve Prolapse (MVP)


Additional Past Medical History / Comment(s): Mitral valve prolapse, 

hypoglycemia, colitis/gastroenteritis with sepsis, migrained, back pain, 

herniated disc, scoliosis, R hand tremors, past L foot ulcer.


History of Any Multi-Drug Resistant Organisms: None Reported


Past Surgical History: Back Surgery, Cholecystectomy, Heart Catheterization


Additional Past Surgical History / Comment(s): Back has 2 rods, bilateral 

cataract removals, colonoscopy, hemorrhoidectomy, EGD, pain clinic procedures.


Past Anesthesia/Blood Transfusion Reactions: No Reported Reaction


Smoking Status: Former smoker





- Past Family History


  ** Father


Additional Family Medical History / Comment(s): ETOH, never went to the doctors





  ** Mother


Family Medical History: Diabetes Mellitus





Medications and Allergies


                                Home Medications











 Medication  Instructions  Recorded  Confirmed  Type


 


Aspirin 81 mg PO HS 12/08/14 01/13/22 History


 


Baclofen [Lioresal] 10 mg PO Q6H 12/08/14 01/13/22 History


 


Butalb/Acetaminophen/Caffeine 1 - 2 tab PO QID PRN MDD 6 tabs 12/08/14 01/13/22 

History





[Fioricet -40]    


 


Sertraline [Zoloft] 50 mg PO DAILY 12/08/14 01/13/22 History


 


Carbidopa-Levodopa  mg 1 tab PO AS DIRECTED 09/22/20 01/13/22 History





[Sinemet  mg]    


 


Potassium Chloride ER [K-Dur 10] 10 meq PO DAILY 5 Days #5 09/25/20 01/13/22 Rx





 tab.er.prt   


 


Rivaroxaban [Xarelto] 15 mg PO DAILY  tab 09/25/20 01/13/22 Rx


 


Furosemide [Lasix] 20 mg PO DAILY 01/13/22 01/13/22 History


 


Metoprolol Tartrate [Lopressor] 50 mg PO BID-W/MEALS 01/13/22 01/13/22 History


 


Ascorbic Acid [Vitamin C] 500 mg PO BID #60 tab 01/14/22  Rx


 


Cholecalciferol [Vitamin D3 (125 125 mcg PO DAILY #30 tablet 01/14/22  Rx





Mcg = 5000 Iu)]    


 


Zinc Sulfate [Orazinc] 220 mg PO DAILY #30 cap 01/14/22  Rx








                                    Allergies











Allergy/AdvReac Type Severity Reaction Status Date / Time


 


No Known Allergies Allergy   Verified 09/22/20 08:38














Surgical - Exam


                                   Vital Signs











Temp Pulse Resp BP Pulse Ox


 


 99.3 F   103 H  18   94/64   90 L


 


 01/12/22 23:57  01/12/22 23:57  01/12/22 23:57  01/12/22 23:57  01/12/22 23:57














- General


well developed, well nourished, no distress





- Eyes


PERRL





- ENT


normal pinna





- Neck


no masses





- Respiratory


normal expansion





- Cardiovascular


Rhythm: regular





- Abdomen


Abdomen: soft, non tender





Results





- Labs





                                 01/14/22 08:05





                                 01/14/22 08:05





Assessment and Plan


Assessment: 





History of rectal prolapse.  Patient will require intervention once she feels 

well.  She will need to wait at least a month due to her recent cold pneumonia. 

 She will ultimately undergo colonoscopy and possible colon resection for her 

rectal prolapse.

## 2022-01-17 VITALS
RESPIRATION RATE: 16 BRPM | HEART RATE: 81 BPM | DIASTOLIC BLOOD PRESSURE: 85 MMHG | TEMPERATURE: 98 F | SYSTOLIC BLOOD PRESSURE: 129 MMHG

## 2022-01-17 LAB
ANION GAP SERPL CALC-SCNC: 6 MMOL/L
BUN SERPL-SCNC: 12 MG/DL (ref 7–17)
CALCIUM SPEC-MCNC: 8.8 MG/DL (ref 8.4–10.2)
CHLORIDE SERPL-SCNC: 101 MMOL/L (ref 98–107)
CO2 SERPL-SCNC: 31 MMOL/L (ref 22–30)
GLUCOSE SERPL-MCNC: 91 MG/DL (ref 74–99)
POTASSIUM SERPL-SCNC: 3.5 MMOL/L (ref 3.5–5.1)
SODIUM SERPL-SCNC: 138 MMOL/L (ref 137–145)

## 2022-01-17 RX ADMIN — CEFAZOLIN SCH: 330 INJECTION, POWDER, FOR SOLUTION INTRAMUSCULAR; INTRAVENOUS at 11:04

## 2022-01-17 RX ADMIN — CARBIDOPA AND LEVODOPA SCH EACH: 25; 100 TABLET ORAL at 00:32

## 2022-01-17 RX ADMIN — FAMOTIDINE SCH MG: 20 TABLET, FILM COATED ORAL at 08:16

## 2022-01-17 RX ADMIN — METOPROLOL TARTRATE SCH MG: 50 TABLET, FILM COATED ORAL at 08:16

## 2022-01-17 RX ADMIN — POTASSIUM CHLORIDE SCH MEQ: 750 TABLET, EXTENDED RELEASE ORAL at 08:17

## 2022-01-17 RX ADMIN — CARBIDOPA AND LEVODOPA SCH EACH: 25; 100 TABLET ORAL at 08:16

## 2022-01-17 RX ADMIN — Medication SCH MG: at 08:16

## 2022-01-17 RX ADMIN — RIVAROXABAN SCH MG: 15 TABLET, FILM COATED ORAL at 08:18

## 2022-01-17 RX ADMIN — FUROSEMIDE SCH MG: 20 TABLET ORAL at 08:16

## 2022-01-17 RX ADMIN — CARBIDOPA AND LEVODOPA SCH EACH: 25; 100 TABLET ORAL at 11:03

## 2022-01-17 RX ADMIN — BACLOFEN SCH MG: 10 TABLET ORAL at 11:03

## 2022-01-17 RX ADMIN — CHOLECALCIFEROL TAB 125 MCG (5000 UNIT) SCH MCG: 125 TAB at 08:16

## 2022-01-17 RX ADMIN — SERTRALINE HYDROCHLORIDE SCH MG: 50 TABLET, FILM COATED ORAL at 08:18

## 2022-01-17 RX ADMIN — BUTALBITAL, ACETAMINOPHEN, CAFFEINE, AND CODEINE PHOSPHATE PRN EACH: 30; 50; 40; 325 CAPSULE ORAL at 13:24

## 2022-01-17 RX ADMIN — BUTALBITAL, ACETAMINOPHEN, CAFFEINE, AND CODEINE PHOSPHATE PRN EACH: 30; 50; 40; 325 CAPSULE ORAL at 02:20

## 2022-01-17 RX ADMIN — CARBIDOPA AND LEVODOPA SCH EACH: 25; 100 TABLET ORAL at 05:36

## 2022-01-17 RX ADMIN — BUTALBITAL, ACETAMINOPHEN, CAFFEINE, AND CODEINE PHOSPHATE PRN EACH: 30; 50; 40; 325 CAPSULE ORAL at 08:24

## 2022-01-17 RX ADMIN — BACLOFEN SCH MG: 10 TABLET ORAL at 00:33

## 2022-01-17 RX ADMIN — CARBIDOPA AND LEVODOPA SCH EACH: 25; 100 TABLET ORAL at 00:36

## 2022-01-17 RX ADMIN — CARBIDOPA AND LEVODOPA SCH EACH: 25; 100 TABLET ORAL at 13:24

## 2022-01-17 RX ADMIN — OXYCODONE HYDROCHLORIDE AND ACETAMINOPHEN SCH MG: 500 TABLET ORAL at 08:16

## 2022-01-17 RX ADMIN — BACLOFEN SCH MG: 10 TABLET ORAL at 05:36

## 2022-01-17 NOTE — P.DS
Providers


Date of admission: 


01/15/22 16:11





Expected date of discharge: 01/17/22


Attending physician: 


Thai Cotton





Consults: 





                                        





01/13/22 01:29


Consult Physician Routine 


   Consulting Provider: Kadi Jones


   Consult Reason/Comments: covid


   Do you want consulting provider notified?: Yes





01/15/22 11:34


Consult Physician Routine 


   Consulting Provider: Morgan Bullard


   Consult Reason/Comments: rehab


   Do you want consulting provider notified?: Yes





01/15/22 12:57


Consult Physician Routine 


   Consulting Provider: Philipp Trejo


   Consult Reason/Comments: prolapsed rectum


   Do you want consulting provider notified?: Yes











Primary care physician: 


Michael Painting MD





Hospital Course: 





Chief Complaint: Feeling unwell





This is a 81-year-old patient, Dr. Michael Painting.  Chronic stable medical 

conditions include atrial fibrillation, GERD, hypertension, mitral valve 

prolapse, chronic low back pain with any dizziness, scoliosis, right hand 

tremors, anxiety depression.  Does use a walker to ambulate.


Per the EMS/ER report patient was not able to ambulate without assistance which 

is not normal.  Decrease eating and drinking at least the last 23 days.  Patient

also has a sore throat low-grade fever for at least 2 days.  Cough.  No sputum. 

Pulse ox initially noted was 89%.  Tired rundown.


Admitted with COVID 19 pneumonitis and mild hypoxic failure.


January 14: Feeling better.  Eating about 25-50%.  Does feel tired.  And weak.  

Daughter was called and she has not comfortable taking the patient home.  PTOT 

been consulted.  Discharged postponed until further evaluation by PT OT.


January 15: Eating better.  Tired.  Seen by PT OT.  Recommended [facility] re[facility].  

Social work consulted.  Breathing stable.


January 16: Oral intake fair.  Patient's today's wondering if she can go home.  

We will await PTOT recommendation from tomorrow. 


January 17: Patient was keen to go home.  She did ambulate with the nurse was 

able to get to the bathroom by her own.  Daughter was called.   was 

involved.  Nurse was involved.  Final plan was for patient to be discharged 

home.  Breathing well.  Oral intake good.  Patient also has a rectal prolapse.  

Will follow up with Dr. Trejo for the same.


Discussion and discharge planning more than 35 minutes





Consultation:


Dr. Trejo from general surgery 














Past medical history to include:


Atrial fibrillation, GERD, hypertension, mitral valve prolapse, colitis, lower 

back herniated disc, scoliosis, right hand tremor, pain clinic procedures, 

anxiety depression





Social history:


Patient lives with her daughter.  Does use a walker.  Patient smoked for 20 

years stopped in 1986.  No alcohol.





Family history:


Diabetes, alcohol





Physical examination:


VITAL SIGNS: 98, 81, 16, 129/85, 97% room air


GENERAL: , laying in bed,  awake, neck tilted to left.


EYES: Pupils equal.  Conjunctiva normal.


HEENT: External appearance of nose and ears normal, oral cavity dry mucous 

membranes.


NECK: JVD not raised; masses not palpable.


HEART: First and second heart sounds are normal;  no edema.  


LUNGS: Respiratory rate normal, decreased breath sounds, 


ABDOMEN: Soft,  nontender, liver spleen not palpable, no masses palpable.  Rec

eli prolapse.  Reducible.  


PSYCH: Alert and oriented x3;  mood  and affect normal.  


MUSCULOSKELETAL:No Clubbing/cyanosis;muscles-grossly intact.  Neck tilted to 

left


NEUROLOGICAL: Cranial nerves grossly intact; no facial asymmetry,   power and 

sensation grossly intact. 








INVESTIGATIONS, reviewed in the clinical context:


January 17: Potassium 3.5 crit 0.56


January 14: White count 5.9 hemoglobin 11.7 platelets 112 d-dimer 0.63 potassium

3.5 BUN 18 creatinine 0.48


White count 5.3 hemoglobin 11.5 platelets 113 sodium 135 potassium 3.2.  23 

creatinine 0.61 albumin 3.4


Coronavirus [PCR]: Not detected


EKG tracing personally reviewed by me-atrial flutter/fibrillation.  Nonspecific 

ST segment changes.  Rate 95


Chest x-ray film personally reviewed by me-possible infiltrate.  Some 

cardiomegaly.





Assessment and plan:





-Acute COVID 19 pneumonitis.


Vitamin C, vitamin D, zinc.  On xarelto





-Acute hypoxic respiratory failure will pulse ox 89% at home and 90% on room air

 upon presentation.:  Better


Dexamethasone 6 mg daily.  Supplement oxygen to keep pulse ox above 92%.  Pulse 

ox 97% on room air





-Persistent atrial flutter fibrillation, controlled


Continue xarelto.  Lopressor 50 mg 3 times a day





-GERD


Pepcid 20 mg twice a day





-Chronic gait dysfunction, uses a walker


Activity as tolerated





-Anxiety/depression


Zoloft 50 mg a day





-Chronic muscle spasms


Baclofen 10 mg every 6





-Acute medical debility.


PT OT: Recommend [facility].  Patient doing better.  Home with home care.





Disposition:


Home with home health care

















Plan - Discharge Summary


Discharge Rx Participant: No


New Discharge Prescriptions: 


New


   Zinc Sulfate [Orazinc] 220 mg PO DAILY #30 cap


   Ascorbic Acid [Vitamin C] 500 mg PO BID #60 tab


   Cholecalciferol [Vitamin D3 (125 Mcg = 5000 Iu)] 125 mcg PO DAILY #30 tablet





Continue


   Aspirin 81 mg PO HS


   Sertraline [Zoloft] 50 mg PO DAILY


   Butalb/Acetaminophen/Caffeine [Fioricet -40] 1 - 2 tab PO QID PRN MDD 6

tabs


     PRN Reason: Migraine Headache


   Baclofen [Lioresal] 10 mg PO Q6H


   Carbidopa-Levodopa  mg [Sinemet  mg] 1 tab PO AS DIRECTED


   Potassium Chloride ER [K-Dur 10] 10 meq PO DAILY 5 Days #5 tab.er.prt


   Rivaroxaban [Xarelto] 15 mg PO DAILY  tab


   Furosemide [Lasix] 20 mg PO DAILY


   Metoprolol Tartrate [Lopressor] 50 mg PO BID-W/MEALS


Discharge Medication List





Aspirin 81 mg PO HS 12/08/14 [History]


Baclofen [Lioresal] 10 mg PO Q6H 12/08/14 [History]


Butalb/Acetaminophen/Caffeine [Fioricet -40] 1 - 2 tab PO QID PRN MDD 6 

tabs 12/08/14 [History]


Sertraline [Zoloft] 50 mg PO DAILY 12/08/14 [History]


Carbidopa-Levodopa  mg [Sinemet  mg] 1 tab PO AS DIRECTED 09/22/20 

[History]


Potassium Chloride ER [K-Dur 10] 10 meq PO DAILY 5 Days #5 tab.er.prt 09/25/20 

[Rx]


Rivaroxaban [Xarelto] 15 mg PO DAILY  tab 09/25/20 [Rx]


Furosemide [Lasix] 20 mg PO DAILY 01/13/22 [History]


Metoprolol Tartrate [Lopressor] 50 mg PO BID-W/MEALS 01/13/22 [History]


Ascorbic Acid [Vitamin C] 500 mg PO BID #60 tab 01/14/22 [Rx]


Cholecalciferol [Vitamin D3 (125 Mcg = 5000 Iu)] 125 mcg PO DAILY #30 tablet 

01/14/22 [Rx]


Zinc Sulfate [Orazinc] 220 mg PO DAILY #30 cap 01/14/22 [Rx]








Follow up Appointment(s)/Referral(s): 


Michael Painting MD [Primary Care Provider] - 1-2 days


[facility], [NON-STAFF] - 1-2 Days


Patient Instructions/Handouts:  Coronavirus Disease 2019 (COVID-19)


Discharge Disposition: HOME SELF-CARE

## 2022-01-17 NOTE — P.CONS
History of Present Illness





- Chief Complaint


Medical debility





- History of Present Illness





I had the opportunity to see patient for inpatient rehab consultation with 

regard to medical debility.  Patient admitted to [facility] January 12 weakness, 

cough, fever and Covid positive pneumonia.  Admitted to Dr. Cotton.  Seen by 

Dr. Jones.  Seen by Dr. Angela pradhan for rectal prolapse 5 inch, treatment more

medically stable.  Chest x-rays followed for mild infiltrates in the left base. 

PT and OT prescribed.





Previous functional history as elicited from patient: 81-year-old right-handed 

white female who is  who lives and daughters first-floor of a 2 floor home.

 Daughter does cooking, laundry, driving.  Patient describes independent with 

sitdown shower and gait with 4 wheeled walker.  PCP Dr. Michael Painting.





Review of Systems





Review of systems:


ENT: Denies sneezes or discharge.


Eyes: Denies discharge or photophobia.


Cardiac: Denies chest pain or palpitation.


Pulmonary: Mild shortness of breath.


Breast: Denies discharge or lumps.


Gastrointestinal: Denies nausea, emesis, constipation, diarrhea.


Genitourinary: Denies discharge or frequency.


Musculoskeletal: Denies muscle or bone aches.


Neurologic: Mild weakness.


Endocrine: Denies shakes or sweats.


Oncology: Denies cancers.


Dermatologic: Denies rash, itching, pruritus.


ALLERGY/immunology: Denies sneezes, rashes.








Past Medical History


Past Medical History: Atrial Fibrillation, Eye Disorder, GERD/Reflux, 

Hypertension, Mitral Valve Prolapse (MVP)


Additional Past Medical History / Comment(s): Mitral valve prolapse, 

hypoglycemia, colitis/gastroenteritis with sepsis, migrained, back pain, 

herniated disc, scoliosis, R hand tremors, past L foot ulcer.


History of Any Multi-Drug Resistant Organisms: None Reported


Past Surgical History: Back Surgery, Cholecystectomy, Heart Catheterization


Additional Past Surgical History / Comment(s): Back has 2 rods, bilateral 

cataract removals, colonoscopy, hemorrhoidectomy, EGD, pain clinic procedures.


Past Anesthesia/Blood Transfusion Reactions: No Reported Reaction


Smoking Status: Former smoker





- Past Family History


  ** Father


Additional Family Medical History / Comment(s): ETOH, never went to the doctors





  ** Mother


Family Medical History: Diabetes Mellitus





Medications and Allergies


                                Home Medications











 Medication  Instructions  Recorded  Confirmed  Type


 


Aspirin 81 mg PO HS 12/08/14 01/13/22 History


 


Baclofen [Lioresal] 10 mg PO Q6H 12/08/14 01/13/22 History


 


Butalb/Acetaminophen/Caffeine 1 - 2 tab PO QID PRN MDD 6 tabs 12/08/14 01/13/22 

History





[Fioricet -40]    


 


Sertraline [Zoloft] 50 mg PO DAILY 12/08/14 01/13/22 History


 


Carbidopa-Levodopa  mg 1 tab PO AS DIRECTED 09/22/20 01/13/22 History





[Sinemet  mg]    


 


Potassium Chloride ER [K-Dur 10] 10 meq PO DAILY 5 Days #5 09/25/20 01/13/22 Rx





 tab.er.prt   


 


Rivaroxaban [Xarelto] 15 mg PO DAILY  tab 09/25/20 01/13/22 Rx


 


Furosemide [Lasix] 20 mg PO DAILY 01/13/22 01/13/22 History


 


Metoprolol Tartrate [Lopressor] 50 mg PO BID-W/MEALS 01/13/22 01/13/22 History


 


Ascorbic Acid [Vitamin C] 500 mg PO BID #60 tab 01/14/22  Rx


 


Cholecalciferol [Vitamin D3 (125 125 mcg PO DAILY #30 tablet 01/14/22  Rx





Mcg = 5000 Iu)]    


 


Zinc Sulfate [Orazinc] 220 mg PO DAILY #30 cap 01/14/22  Rx








                                    Allergies











Allergy/AdvReac Type Severity Reaction Status Date / Time


 


No Known Allergies Allergy   Verified 09/22/20 08:38














Physical Exam


Vitals: 


                                   Vital Signs











  Temp Pulse Resp BP BP Pulse Ox


 


 01/17/22 02:00  97.9 F  80  18  135/83   95


 


 01/16/22 20:00    16   


 


 01/16/22 19:01  98.0 F  105 H  16  111/69   95


 


 01/16/22 14:33  97.9 F  96  18  115/76   93 L


 


 01/16/22 07:15  98.0 F  94  18   144/98  97








                                Intake and Output











 01/16/22 01/16/22 01/17/22





 14:59 22:59 06:59


 


Intake Total 200  


 


Balance 200  


 


Intake:   


 


  Oral 200  


 


Other:   


 


  Voiding Method  Bedside Commode 


 


  # Voids 1 1 














Skin: Atrophic, intact.


General: Thin build and comfortable appearance.


Head: Normocephalic, atraumatic.


Eyes: Symmetric.  Pupils equal round.


Ears: Symmetric.  Hearing within normal limits.


Mouth: Clear.


Neck: Supple.  Carotid without bruit.


Cardiac: Regular rate and rhythm.


Lungs: Clear anteriorly and posteriorly.


Abdomen: Soft active nontender.


Extremities: Normal tone.


Back: Increase in thoracic kyphosis.


Neurological: Mental status: Alert, cooperative, pleasant.


Cranial nerves: Symmetric facial tone and trapezius.


Motor: Active movement greater than antigravity all 4 limbs.


Sensation: Intact throughout.


DTRs: Symmetric and equal throughout.


Mobility: Sits and stands with minimal and moderate assist into bed.





Results


CBC & Chem 7: 


                                 01/14/22 08:05





                                 01/14/22 08:05





Assessment and Plan


(1) Pneumonia due to COVID-19 virus


Current Visit: Yes   Status: Acute   Code(s): U07.1 - COVID-19; J12.82 - 

PNEUMONIA DUE TO CORONAVIRUS DISEASE 2019   SNOMED Code(s): 708640380988214700


   





(2) Atrial fibrillation with RVR


Current Visit: No   Status: Acute   Code(s): I48.91 - UNSPECIFIED ATRIAL FIBRILL

ATION   SNOMED Code(s): 583558981875602


   





(3) Bleeding hemorrhoid


Current Visit: No   Status: Acute   Code(s): K64.9 - UNSPECIFIED HEMORRHOIDS   

SNOMED Code(s): 69645330


   


Plan: 





comments and plan: At this time PT and OT are prescribed.  Hopefully, patient 

hasn't to become too debilitated as she has been here only a few days.  We'll 

follow therapy with yourself for possible need and benefit of inpatient rehab.  

Currently would anticipate return to home with daughter support and support 

services.

## 2022-01-17 NOTE — P.PN
Subjective


Progress Note Date: 01/17/22





CHIEF COMPLAINT: Rectal prolapse





HISTORY OF PRESENT ILLNESS: Patient's rectal prolapse is currently reduced.  She

denies any further bleeding.  Denies rectal pain.  She is currently hospitalized

with COVID-19 pneumonia.  Afebrile.  Sodium 138 potassium 3.5 creatinine 0.56





PHYSICAL EXAM: 


VITAL SIGNS: Reviewed.


GENERAL: Well-developed in no acute distress. 


HEENT:  No sclera icterus. Extraocular movements grossly intact.  Moist buccal 

mucosa. Head is atraumatic, normocephalic. 


ABDOMEN:  Soft.  Nondistended. Nontender. 


NEUROLOGIC: Alert and oriented. Cranial nerves II through XII grossly intact.





ASSESSMENT: 


1.  Rectal prolapse


2.  COVID-19 pneumonia








PLAN: 


-Patient will require surgical intervention for the rectal prolapse outpatient


-Recommend to wait at least one month for surgical intervention due to recent 

Covid pneumonia


-Patient did ultimately undergo colonoscopy and possible colon resection for her

rectal prolapse


-Continue supportive care





Physician Assistant note has been reviewed by physician. Signing provider agrees

with the documented findings, assessment, and plan of care. 





Objective





- Vital Signs


Vital signs: 


                                   Vital Signs











Temp  98 F   01/17/22 07:00


 


Pulse  81   01/17/22 07:00


 


Resp  16   01/17/22 07:00


 


BP  129/85   01/17/22 07:00


 


Pulse Ox  97   01/17/22 07:00








                                 Intake & Output











 01/16/22 01/17/22 01/17/22





 18:59 06:59 18:59


 


Intake Total 200  


 


Balance 200  


 


Intake:   


 


  Oral 200  


 


Other:   


 


  Voiding Method  Bedside Commode Bedside Commode


 


  # Voids 1 1 2














- Labs


CBC & Chem 7: 


                                 01/14/22 08:05





                                 01/17/22 07:03


Labs: 


                  Abnormal Lab Results - Last 24 Hours (Table)











  01/17/22 Range/Units





  07:03 


 


Carbon Dioxide  31 H  (22-30)  mmol/L